# Patient Record
Sex: FEMALE
[De-identification: names, ages, dates, MRNs, and addresses within clinical notes are randomized per-mention and may not be internally consistent; named-entity substitution may affect disease eponyms.]

---
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
## 2024-04-09 ENCOUNTER — INPATIENT (INPATIENT)
Facility: HOSPITAL | Age: 19
LOS: 16 days | Discharge: HOME | End: 2024-04-26
Attending: STUDENT IN AN ORGANIZED HEALTH CARE EDUCATION/TRAINING PROGRAM | Admitting: STUDENT IN AN ORGANIZED HEALTH CARE EDUCATION/TRAINING PROGRAM
Payer: COMMERCIAL

## 2024-04-09 ENCOUNTER — EMERGENCY (EMERGENCY)
Facility: HOSPITAL | Age: 19
LOS: 1 days | Discharge: OTHER INSTITUTION | End: 2024-04-09
Attending: EMERGENCY MEDICINE
Payer: COMMERCIAL

## 2024-04-09 VITALS
OXYGEN SATURATION: 98 % | TEMPERATURE: 98 F | RESPIRATION RATE: 18 BRPM | DIASTOLIC BLOOD PRESSURE: 71 MMHG | SYSTOLIC BLOOD PRESSURE: 107 MMHG | HEART RATE: 73 BPM

## 2024-04-09 VITALS
WEIGHT: 128.31 LBS | TEMPERATURE: 98 F | RESPIRATION RATE: 18 BRPM | DIASTOLIC BLOOD PRESSURE: 80 MMHG | HEART RATE: 85 BPM | SYSTOLIC BLOOD PRESSURE: 118 MMHG | OXYGEN SATURATION: 96 %

## 2024-04-09 VITALS — WEIGHT: 130.07 LBS | HEIGHT: 70 IN | TEMPERATURE: 98 F | OXYGEN SATURATION: 99 % | RESPIRATION RATE: 16 BRPM

## 2024-04-09 DIAGNOSIS — F32.A DEPRESSION, UNSPECIFIED: ICD-10-CM

## 2024-04-09 DIAGNOSIS — F32.9 MAJOR DEPRESSIVE DISORDER, SINGLE EPISODE, UNSPECIFIED: ICD-10-CM

## 2024-04-09 LAB
ALBUMIN SERPL ELPH-MCNC: 4.2 G/DL — SIGNIFICANT CHANGE UP (ref 3.3–5.2)
ALP SERPL-CCNC: 67 U/L — SIGNIFICANT CHANGE UP (ref 40–120)
ALT FLD-CCNC: 15 U/L — SIGNIFICANT CHANGE UP
AMPHET UR-MCNC: NEGATIVE — SIGNIFICANT CHANGE UP
ANION GAP SERPL CALC-SCNC: 14 MMOL/L — SIGNIFICANT CHANGE UP (ref 5–17)
APAP SERPL-MCNC: 60.8 UG/ML — HIGH (ref 10–26)
APAP SERPL-MCNC: 93.2 UG/ML — HIGH (ref 10–26)
APPEARANCE UR: CLEAR — SIGNIFICANT CHANGE UP
AST SERPL-CCNC: 27 U/L — SIGNIFICANT CHANGE UP
BARBITURATES UR SCN-MCNC: NEGATIVE — SIGNIFICANT CHANGE UP
BASOPHILS # BLD AUTO: 0.07 K/UL — SIGNIFICANT CHANGE UP (ref 0–0.2)
BASOPHILS NFR BLD AUTO: 1 % — SIGNIFICANT CHANGE UP (ref 0–2)
BENZODIAZ UR-MCNC: NEGATIVE — SIGNIFICANT CHANGE UP
BILIRUB SERPL-MCNC: 0.6 MG/DL — SIGNIFICANT CHANGE UP (ref 0.4–2)
BILIRUB UR-MCNC: NEGATIVE — SIGNIFICANT CHANGE UP
BUN SERPL-MCNC: 14.3 MG/DL — SIGNIFICANT CHANGE UP (ref 8–20)
CALCIUM SERPL-MCNC: 8.9 MG/DL — SIGNIFICANT CHANGE UP (ref 8.4–10.5)
CHLORIDE SERPL-SCNC: 100 MMOL/L — SIGNIFICANT CHANGE UP (ref 96–108)
CO2 SERPL-SCNC: 22 MMOL/L — SIGNIFICANT CHANGE UP (ref 22–29)
COCAINE METAB.OTHER UR-MCNC: NEGATIVE — SIGNIFICANT CHANGE UP
COLOR SPEC: YELLOW — SIGNIFICANT CHANGE UP
CREAT SERPL-MCNC: 0.65 MG/DL — SIGNIFICANT CHANGE UP (ref 0.5–1.3)
DIFF PNL FLD: NEGATIVE — SIGNIFICANT CHANGE UP
EGFR: 129 ML/MIN/1.73M2 — SIGNIFICANT CHANGE UP
EOSINOPHIL # BLD AUTO: 0.09 K/UL — SIGNIFICANT CHANGE UP (ref 0–0.5)
EOSINOPHIL NFR BLD AUTO: 1.3 % — SIGNIFICANT CHANGE UP (ref 0–6)
ETHANOL SERPL-MCNC: <10 MG/DL — SIGNIFICANT CHANGE UP (ref 0–9)
GLUCOSE SERPL-MCNC: 87 MG/DL — SIGNIFICANT CHANGE UP (ref 70–99)
GLUCOSE UR QL: NEGATIVE MG/DL — SIGNIFICANT CHANGE UP
HCG SERPL-ACNC: <4 MIU/ML — SIGNIFICANT CHANGE UP
HCT VFR BLD CALC: 40.7 % — SIGNIFICANT CHANGE UP (ref 34.5–45)
HGB BLD-MCNC: 13.9 G/DL — SIGNIFICANT CHANGE UP (ref 11.5–15.5)
IMM GRANULOCYTES NFR BLD AUTO: 0.1 % — SIGNIFICANT CHANGE UP (ref 0–0.9)
KETONES UR-MCNC: ABNORMAL MG/DL
LEUKOCYTE ESTERASE UR-ACNC: NEGATIVE — SIGNIFICANT CHANGE UP
LYMPHOCYTES # BLD AUTO: 2.16 K/UL — SIGNIFICANT CHANGE UP (ref 1–3.3)
LYMPHOCYTES # BLD AUTO: 30.8 % — SIGNIFICANT CHANGE UP (ref 13–44)
MCHC RBC-ENTMCNC: 31.7 PG — SIGNIFICANT CHANGE UP (ref 27–34)
MCHC RBC-ENTMCNC: 34.2 GM/DL — SIGNIFICANT CHANGE UP (ref 32–36)
MCV RBC AUTO: 92.9 FL — SIGNIFICANT CHANGE UP (ref 80–100)
METHADONE UR-MCNC: NEGATIVE — SIGNIFICANT CHANGE UP
MONOCYTES # BLD AUTO: 0.58 K/UL — SIGNIFICANT CHANGE UP (ref 0–0.9)
MONOCYTES NFR BLD AUTO: 8.3 % — SIGNIFICANT CHANGE UP (ref 2–14)
NEUTROPHILS # BLD AUTO: 4.11 K/UL — SIGNIFICANT CHANGE UP (ref 1.8–7.4)
NEUTROPHILS NFR BLD AUTO: 58.5 % — SIGNIFICANT CHANGE UP (ref 43–77)
NITRITE UR-MCNC: NEGATIVE — SIGNIFICANT CHANGE UP
OPIATES UR-MCNC: NEGATIVE — SIGNIFICANT CHANGE UP
PCP SPEC-MCNC: SIGNIFICANT CHANGE UP
PCP UR-MCNC: NEGATIVE — SIGNIFICANT CHANGE UP
PH UR: 6 — SIGNIFICANT CHANGE UP (ref 5–8)
PLATELET # BLD AUTO: 304 K/UL — SIGNIFICANT CHANGE UP (ref 150–400)
POTASSIUM SERPL-MCNC: 4.1 MMOL/L — SIGNIFICANT CHANGE UP (ref 3.5–5.3)
POTASSIUM SERPL-SCNC: 4.1 MMOL/L — SIGNIFICANT CHANGE UP (ref 3.5–5.3)
PROT SERPL-MCNC: 7.8 G/DL — SIGNIFICANT CHANGE UP (ref 6.6–8.7)
PROT UR-MCNC: SIGNIFICANT CHANGE UP MG/DL
RBC # BLD: 4.38 M/UL — SIGNIFICANT CHANGE UP (ref 3.8–5.2)
RBC # FLD: 11.4 % — SIGNIFICANT CHANGE UP (ref 10.3–14.5)
SALICYLATES SERPL-MCNC: <0.6 MG/DL — LOW (ref 10–20)
SARS-COV-2 RNA SPEC QL NAA+PROBE: SIGNIFICANT CHANGE UP
SODIUM SERPL-SCNC: 136 MMOL/L — SIGNIFICANT CHANGE UP (ref 135–145)
SP GR SPEC: >1.03 — HIGH (ref 1–1.03)
THC UR QL: POSITIVE
UROBILINOGEN FLD QL: 1 MG/DL — SIGNIFICANT CHANGE UP (ref 0.2–1)
WBC # BLD: 7.02 K/UL — SIGNIFICANT CHANGE UP (ref 3.8–10.5)
WBC # FLD AUTO: 7.02 K/UL — SIGNIFICANT CHANGE UP (ref 3.8–10.5)

## 2024-04-09 PROCEDURE — 81003 URINALYSIS AUTO W/O SCOPE: CPT

## 2024-04-09 PROCEDURE — 84702 CHORIONIC GONADOTROPIN TEST: CPT

## 2024-04-09 PROCEDURE — 85025 COMPLETE CBC W/AUTO DIFF WBC: CPT

## 2024-04-09 PROCEDURE — 99285 EMERGENCY DEPT VISIT HI MDM: CPT | Mod: 25

## 2024-04-09 PROCEDURE — 80307 DRUG TEST PRSMV CHEM ANLYZR: CPT

## 2024-04-09 PROCEDURE — 99223 1ST HOSP IP/OBS HIGH 75: CPT

## 2024-04-09 PROCEDURE — 87635 SARS-COV-2 COVID-19 AMP PRB: CPT

## 2024-04-09 PROCEDURE — G0378: CPT

## 2024-04-09 PROCEDURE — 93005 ELECTROCARDIOGRAM TRACING: CPT

## 2024-04-09 PROCEDURE — 93010 ELECTROCARDIOGRAM REPORT: CPT

## 2024-04-09 PROCEDURE — 80053 COMPREHEN METABOLIC PANEL: CPT

## 2024-04-09 PROCEDURE — 99221 1ST HOSP IP/OBS SF/LOW 40: CPT

## 2024-04-09 PROCEDURE — 90792 PSYCH DIAG EVAL W/MED SRVCS: CPT

## 2024-04-09 PROCEDURE — 36415 COLL VENOUS BLD VENIPUNCTURE: CPT

## 2024-04-09 RX ORDER — HALOPERIDOL DECANOATE 100 MG/ML
5 INJECTION INTRAMUSCULAR EVERY 6 HOURS
Refills: 0 | Status: DISCONTINUED | OUTPATIENT
Start: 2024-04-09 | End: 2024-04-26

## 2024-04-09 RX ORDER — LANOLIN ALCOHOL/MO/W.PET/CERES
3 CREAM (GRAM) TOPICAL AT BEDTIME
Refills: 0 | Status: DISCONTINUED | OUTPATIENT
Start: 2024-04-09 | End: 2024-04-22

## 2024-04-09 RX ORDER — HALOPERIDOL DECANOATE 100 MG/ML
5 INJECTION INTRAMUSCULAR EVERY 6 HOURS
Refills: 0 | Status: DISCONTINUED | OUTPATIENT
Start: 2024-04-09 | End: 2024-04-10

## 2024-04-09 NOTE — ED PROVIDER NOTE - OBJECTIVE STATEMENT
18-year-old female history of depression presents status post overdose.  At approximately 0030 this evening took 12 500 mg tablets of Tylenol in an attempt to commit suicide.  States she felt  increasingly depressed and helpless, has had increased stress at home.  1 prior suicide attempt by taking Benadryl but did not seek medical attention at that point.  No history of psychiatric admissions.  No longer follows with a therapist or psychiatrist.  Denies alcohol use, drug use, coingestants.  Denies nausea, vomiting, abdominal pain, or any additional symptoms.

## 2024-04-09 NOTE — BH INPATIENT PSYCHIATRY ASSESSMENT NOTE - NSBHMETABOLIC_PSY_ALL_CORE_FT
BMI:   HbA1c:   Glucose:   BP: --Vital Signs Last 24 Hrs  T(C): 36.8 (04-09-24 @ 15:20), Max: 36.8 (04-09-24 @ 15:20)  T(F): 98.3 (04-09-24 @ 15:20), Max: 98.3 (04-09-24 @ 15:20)  HR: 73 (04-09-24 @ 15:20) (65 - 85)  BP: 107/71 (04-09-24 @ 15:20) (106/69 - 128/76)  BP(mean): --  RR: 18 (04-09-24 @ 15:20) (17 - 18)  SpO2: 98% (04-09-24 @ 15:20) (96% - 99%)      Lipid Panel:  BMI: BMI (kg/m2): 18.7 (04-09-24 @ 17:56)  HbA1c:   Glucose:   BP: --Vital Signs Last 24 Hrs  T(C): 36.7 (04-09-24 @ 17:56), Max: 36.8 (04-09-24 @ 15:20)  T(F): 98.1 (04-09-24 @ 17:56), Max: 98.3 (04-09-24 @ 15:20)  HR: 73 (04-09-24 @ 15:20) (65 - 85)  BP: 107/71 (04-09-24 @ 15:20) (106/69 - 128/76)  BP(mean): --  RR: 16 (04-09-24 @ 17:56) (16 - 18)  SpO2: 99% (04-09-24 @ 17:56) (96% - 99%)    Orthostatic VS  04-09-24 @ 17:56  Lying BP: --/-- HR: --  Sitting BP: 106/74 HR: 89  Standing BP: 117/74 HR: 85  Site: --  Mode: --    Lipid Panel:

## 2024-04-09 NOTE — BH INPATIENT PSYCHIATRY ASSESSMENT NOTE - NSBHCHARTREVIEWVS_PSY_A_CORE FT
Vital Signs Last 24 Hrs  T(C): 36.8 (04-09-24 @ 15:20), Max: 36.8 (04-09-24 @ 15:20)  T(F): 98.3 (04-09-24 @ 15:20), Max: 98.3 (04-09-24 @ 15:20)  HR: 73 (04-09-24 @ 15:20) (65 - 85)  BP: 107/71 (04-09-24 @ 15:20) (106/69 - 128/76)  BP(mean): --  RR: 18 (04-09-24 @ 15:20) (17 - 18)  SpO2: 98% (04-09-24 @ 15:20) (96% - 99%)     Vital Signs Last 24 Hrs  T(C): 36.7 (04-09-24 @ 17:56), Max: 36.8 (04-09-24 @ 15:20)  T(F): 98.1 (04-09-24 @ 17:56), Max: 98.3 (04-09-24 @ 15:20)  HR: 73 (04-09-24 @ 15:20) (65 - 85)  BP: 107/71 (04-09-24 @ 15:20) (106/69 - 128/76)  BP(mean): --  RR: 16 (04-09-24 @ 17:56) (16 - 18)  SpO2: 99% (04-09-24 @ 17:56) (96% - 99%)    Orthostatic VS  04-09-24 @ 17:56  Lying BP: --/-- HR: --  Sitting BP: 106/74 HR: 89  Standing BP: 117/74 HR: 85  Site: --  Mode: --

## 2024-04-09 NOTE — ED BEHAVIORAL HEALTH ASSESSMENT NOTE - HPI (INCLUDE ILLNESS QUALITY, SEVERITY, DURATION, TIMING, CONTEXT, MODIFYING FACTORS, ASSOCIATED SIGNS AND SYMPTOMS)
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 Pt states that she has been feeling increasingly overwhelmed and depressed, decided to take pills in her room while her mother was sleeping. Pt states that her Uncle passed unexpectedly in August and her grandmother  3 months later, has been having a very hard time since then. States she hears herself saying "just do it and get it over with" in her head. Furthermore, tore her labrum recently and has not been able to go to the gym which was a coping mechanism for her. States that she has suicidal thoughts every day, despite attending grief support groups once per week for about 4 weeks. Pt endorses not eating or sleeping well for the past few days, states that on the day of the attempt she did not get out of bed or take care of herself at all. Pt has one prior suicide attempt about 1.5 years ago, attempted with Benadryl overdose and called suicide hotline. Did not receive medical attention following this attempt. Furthermore, pt states she saw a psychiatrist for about one year at the age of 14 but has not sought any medical attention for her mental health since then, does not take any medication. 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    Pt states that she has been feeling increasingly overwhelmed and depressed, decided to take pills in her room while her mother was sleeping. Pt states that her Uncle passed unexpectedly in August and her grandmother  3 months later, has been having a very hard time since then. States she hears herself saying "just do it and get it over with" in her head. Furthermore, tore her labrum recently and has not been able to go to the gym which was a coping mechanism for her. States that she has suicidal thoughts every day, despite attending grief support groups once per week for about 4 weeks. Pt endorses not eating or sleeping well for the past few days. increasingly amotivated anhedonic. states that on the day of the attempt she did not get out of bed or take care of herself at all. Pt has one prior suicide attempt about 1.5 years ago, attempted with Benadryl overdose and called suicide hotline. Did not receive medical attention following this attempt. Furthermore, pt states she saw a psychiatrist for about one year at the age of 14 but has not sought any medical attention for her mental health since then, does not take any medication.  does not appear overtly manic or psychotic. no avh, no overt delusions elicited.  drinks alcohol about weekly ~5 shots of hard liquor. (no history of complicated withdrawal or symptoms of severe withdrawal eg seizures delirium tremens). smokes cannabis occasional one blunt for sleep and appetite aid. no tobacco.

## 2024-04-09 NOTE — BH INPATIENT PSYCHIATRY ASSESSMENT NOTE - DETAILS
Feels suicidal every day, during current attempt, states she impulsively decided to overdose on Tylenol

## 2024-04-09 NOTE — CHART NOTE - NSCHARTNOTEFT_GEN_A_CORE
CHRISTOS Note: Notified by  provider that the plan is to transfer pt for IP psychiatric care. Met with pt who was agreeable to the plan and will complete 9.13 legals. Discussed a referral to [facility] college unit. pt in agreement. Spoke with Desi @ [facility] and referral pending MD review. SW will follow

## 2024-04-09 NOTE — BH INPATIENT PSYCHIATRY ASSESSMENT NOTE - OTHER PAST PSYCHIATRIC HISTORY (INCLUDE DETAILS REGARDING ONSET, COURSE OF ILLNESS, INPATIENT/OUTPATIENT TREATMENT)
Hx depression, anxiety. Pt has one prior suicide attempt about 1.5 years ago, attempted with Benadryl overdose and called suicide hotline. Did not receive medical attention following this attempt. Furthermore, pt states she saw a therapist for about one year at the age of 14 but has not sought any attention for her mental health since then, does not take any medication.

## 2024-04-09 NOTE — ED PROVIDER NOTE - ATTENDING CONTRIBUTION TO CARE
Delia: I performed a face to face bedside interview with patient regarding history of present illness, review of symptoms and past medical history. I completed an independent physical exam and ordered tests/medications as needed.  I have discussed patient's plan of care with the resident. The resident assisted in  executing the discussed plan. I was available for any questions or issues that may have arose during the execution of the plan of care.

## 2024-04-09 NOTE — ED ADULT NURSE REASSESSMENT NOTE - DESCRIPTION
received pt in [facility] by security for contraband. [facility].  as per pt mentally she had a hard time.  she went into the cabinet found tylenol and googled how much she would need to take to overdose.  as per pt she read 4000mg so she took 6000.  she then spoke with a friend who told her it would not kill her right away. pt states she thought it would.  when she found out she called her father.  pt admits to 1 past s/a stating she took benadryl ans was drinking.  pt denies being hospitalized after first attempt.  pt denies pmh, pph.  denies avh denies h/i. she admits to occasionally drinking. but when she does she "usually gets drunk" as per pt drinks 5-6 shots when she drinks. denies drug use.

## 2024-04-09 NOTE — ED BEHAVIORAL HEALTH ASSESSMENT NOTE - DETAILS
Feels suicidal every day, during current attempt, states she impulsively decided to overdose on Tylenol self referred tbd

## 2024-04-09 NOTE — ED ADULT TRIAGE NOTE - CHIEF COMPLAINT QUOTE
s/p took tylenol 6000mg about 35 mins ago, Suicidal. Pt has been depressed lost 2 family members in the past 3 months. Hx of anxiety

## 2024-04-09 NOTE — ED BEHAVIORAL HEALTH ASSESSMENT NOTE - NSBHSAALC_PSY_A_CORE FT
Socially Socially, drinks on the weekends, has not drank in about 2 weeks. States she usually takes about 6 shots.

## 2024-04-09 NOTE — ED BEHAVIORAL HEALTH ASSESSMENT NOTE - RISK ASSESSMENT
rf:  mental health condition(s)  substance abuse  suicide attempts    pf:  no access to firearms  help seeking

## 2024-04-09 NOTE — ED BEHAVIORAL HEALTH ASSESSMENT NOTE - TELEPSYCHIATRY?
Problem: Postoperative Care  Goal: Elimination status is maintained/returned to baseline  Outcome: Outcome Not Met, Continue to Monitor  Patient on a bowel prep       No

## 2024-04-09 NOTE — ED PROVIDER NOTE - CLINICAL SUMMARY MEDICAL DECISION MAKING FREE TEXT BOX
Patient s/p Tylenol overdose, reported 6 g in total.  Plan for labs, one-to-one, 4-hour Tylenol level and if medically cleared will have patient seen by behavioral health. Patient s/p Tylenol overdose, reported 6 g in total.  Plan for labs, one-to-one, 4-hour Tylenol level and if medically cleared will have patient seen by behavioral health.    4-hour level 60.8.  No LFT abnormalities.  Patient remains asymptomatic.  Patient is medically cleared to go back to behavioral health.

## 2024-04-09 NOTE — ED BEHAVIORAL HEALTH ASSESSMENT NOTE - DESCRIPTION
Lives with mother, step-father, and sister. 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 Vital Signs Last 24 Hrs  T(C): 36.7 (09 Apr 2024 07:29), Max: 36.7 (09 Apr 2024 01:25)  T(F): 98.1 (09 Apr 2024 07:29), Max: 98.1 (09 Apr 2024 07:29)  HR: 75 (09 Apr 2024 07:29) (65 - 85)  BP: 119/60 (09 Apr 2024 07:29) (106/69 - 128/76)  BP(mean): --  RR: 18 (09 Apr 2024 07:29) (17 - 18)  SpO2: 98% (09 Apr 2024 07:29) (96% - 99%)    Parameters below as of 09 Apr 2024 07:29  Patient On (Oxygen Delivery Method): room air    ECG and UA completed None

## 2024-04-09 NOTE — BH INPATIENT PSYCHIATRY ASSESSMENT NOTE - NSBHASSESSSUMMFT_PSY_ALL_CORE
18 year-old with no previous psych admissions not currently on meds, s/p Tylenol ingestion, medically cleared after 4 hour levels not in danger, no NAC protocol, no LFT elevations.   Routine checks, no current suicidal ideations  LFTs for tomorrow AM  Defer meds to primary team

## 2024-04-09 NOTE — ED ADULT NURSE NOTE - OBJECTIVE STATEMENT
assumed care of pt in CC ar 1330. pt changed into yellow gown, belongings secured. pt reports taking 6000 mg of Tylenol at 12:30. pt reports SI, after not cooping well with death in her family recently. pt denies prior hospitalizations. pmh of anxiety and depression. c/o abd pain. rr even and unlabored. placed on cardiac monitor and . anox4. denies HI, auditory, visual hallucinations. denies drugs or alcohol. pt educated on plan of care, pt able to successfully teach back plan of care to RN, RN will continue to reeducate pt during hospital stay.

## 2024-04-09 NOTE — ED BEHAVIORAL HEALTH ASSESSMENT NOTE - NSSUICPROTFACT_PSY_ALL_CORE
Upcoming travel/Supportive social network of family or friends/Engaged in work or school DC instructions

## 2024-04-09 NOTE — BH INPATIENT PSYCHIATRY ASSESSMENT NOTE - HPI (INCLUDE ILLNESS QUALITY, SEVERITY, DURATION, TIMING, CONTEXT, MODIFYING FACTORS, ASSOCIATED SIGNS AND SYMPTOMS)
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 and works as a  part-time, PMHx depression and anxiety presented to [facility] ED s/p 6000mg Tylenol in attempted overdose.    Pt states that she has been feeling increasingly overwhelmed and depressed, decided to take pills in her room while her mother was sleeping. Pt states that her Uncle passed unexpectedly in August and her grandmother  3 months later, has been having a very hard time since then. States she hears herself saying "just do it and get it over with" in her head. Furthermore, tore her labrum recently and has not been able to go to the gym which was a coping mechanism for her. States that she has suicidal thoughts every day, despite attending grief support groups once per week for about 4 weeks. Pt endorses not eating or sleeping well for the past few days. increasingly amotivated anhedonic. states that on the day of the attempt she did not get out of bed or take care of herself at all. Pt has one prior suicide attempt about 1.5 years ago, attempted with Benadryl overdose and called suicide hotline. Did not receive medical attention following this attempt. Furthermore, pt states she saw a psychiatrist for about one year at the age of 14 but has not sought any medical attention for her mental health since then, does not take any medication.  does not appear overtly manic or psychotic. no avh, no overt delusions elicited.  drinks alcohol about weekly ~5 shots of hard liquor. (no history of complicated withdrawal or symptoms of severe withdrawal eg seizures delirium tremens). smokes cannabis occasional one blunt for sleep and appetite aid. no tobacco. 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 ED s/p 6000mg Tylenol in attempted overdose. Pt states that she has been feeling increasingly overwhelmed and depressed, decided to take pills in her room while her mother was sleeping. Pt states that her Uncle passed unexpectedly in August and her grandmother  3 months later, has been having a very hard time since then. States she hears herself saying "just do it and get it over with" in her head. Furthermore, tore her labrum recently and has not been able to go to the gym which was a coping mechanism for her. States that she has suicidal thoughts every day, despite attending grief support groups once per week for about 4 weeks. Pt endorses not eating or sleeping well for the past few days. increasingly amotivated anhedonic. states that on the day of the attempt she did not get out of bed or take care of herself at all. Pt has one prior suicide attempt about 1.5 years ago, attempted with Benadryl overdose and called suicide hotline. Did not receive medical attention following this attempt. Furthermore, pt states she saw a psychiatrist for about one year at the age of 14 but has not sought any medical attention for her mental health since then, does not take any medication. .drinks alcohol about weekly ~5 shots of hard liquor. (no history of complicated withdrawal or symptoms of severe withdrawal eg seizures delirium tremens). smokes cannabis occasional one blunt for sleep and appetite aid. no tobacco. Acetaminophen 4 hour level from 93.2 to 60.8 ug/ml, medically cleared for psychiatric admission.

## 2024-04-09 NOTE — BH PATIENT PROFILE - IS PATIENT PREGNANT?
Fax received from [facility] stating:    \"There is a delay of testing on the following test\", Paraneoplastic Autoantibody Evaluation.         Dr. Rosen- Just a FYI for you.   no

## 2024-04-09 NOTE — ED BEHAVIORAL HEALTH ASSESSMENT NOTE - OTHER PAST PSYCHIATRIC HISTORY (INCLUDE DETAILS REGARDING ONSET, COURSE OF ILLNESS, INPATIENT/OUTPATIENT TREATMENT)
Hx depression, anxiety. Pt has one prior suicide attempt about 1.5 years ago, attempted with Benadryl overdose and called suicide hotline. Did not receive medical attention following this attempt. Furthermore, pt states she saw a psychiatrist for about one year at the age of 14 but has not sought any medical attention for her mental health since then, does not take any medication. Hx depression, anxiety. Pt has one prior suicide attempt about 1.5 years ago, attempted with Benadryl overdose and called suicide hotline. Did not receive medical attention following this attempt. Furthermore, pt states she saw a therapist for about one year at the age of 14 but has not sought any attention for her mental health since then, does not take any medication.

## 2024-04-09 NOTE — ED BEHAVIORAL HEALTH ASSESSMENT NOTE - SUBSTANCE ISSUES AND PLAN (INCLUDE STANDING AND PRN MEDICATION)
(no history of complicated withdrawal or symptoms of severe withdrawal eg seizures delirium tremens); no recent use

## 2024-04-09 NOTE — BH INPATIENT PSYCHIATRY ASSESSMENT NOTE - NSBHSAALC_PSY_A_CORE FT
Socially, drinks on the weekends, has not drank in about 2 weeks. States she usually takes about 6 shots.

## 2024-04-09 NOTE — BH INPATIENT PSYCHIATRY ASSESSMENT NOTE - MSE UNSTRUCTURED FT
Patient is awake and alert. Affect neutral. Speech fluent. Logical thought. No retardation or agitation, calm at time of exam, well related, no distress. No delusions. Reports "I didn't realize Tylenol could do that slowly." No current suicidal ideations. Fair insight.

## 2024-04-09 NOTE — ED ADULT NURSE REASSESSMENT NOTE - NS ED NURSE REASSESS COMMENT FT1
Assumed care of patient at 07:15.  Patient resting ion bed awake.  Patient oriented to staff and educated about plan of care.  Patient mood is depressed.  Patient reports she does not regret suicide attempt but is hopeful for treatment for her depression.  Verbally contracts not to harm herself in the hospital.  No attempts to harm self or others and safety maintained.
Nurse to nurse report called to Valerie MARQUIS on [facility] at [facility].
pt a&ox3, in no acute distress, resps even and unlabored. pt medically cleared to go to . pt and family updated on POC and verbalizes understanding. report given to Vanessa MARQUIS. pt brought over to  with no incident. pt is calm and cooperative. safety maintained.
pt was evaluated by psychiatry and disposition for inpatient treatment. No recent attempts to harm self or others. pt safety being maintained.
report given to BENITEZ Garay at 0215. pt moved to b4l. rr even and unlabored. continued cardiac monitoring in place. 1:1 at bedside to maintain safety. rn made aware of transfer of care. pt educated on plan of care, pt able to successfully teach back plan of care to RN, RN will continue to reeducate pt during hospital stay.
assumed care of pt from BENITEZ Head. pt a&ox3, in NAD, resps even and unlabored. pt resting comfortably in bed with parents at bedside. pt remains in yellow gown with belongings secured and 1:1 at bedside. pt and family informed of poc and verbalizes understanding. safety maintained.

## 2024-04-09 NOTE — ED CDU PROVIDER INITIAL DAY NOTE - PROGRESS NOTE DETAILS
I have discussed and reviewed the case with [facility] toxicology Fellow Anurag, they agree that pt is cleared from a toxicologic perspective

## 2024-04-09 NOTE — ED PROVIDER NOTE - PHYSICAL EXAMINATION
Gen: Well appearing in NAD  Head: NC/AT  Neck: trachea midline  Resp:  No distress, CTAB  CV: RRR  GI: soft, NTND  Ext: no deformities  Neuro:  A&O appears non focal  Skin:  Warm and dry as visualized  Psych:   flat affect

## 2024-04-09 NOTE — ED BEHAVIORAL HEALTH ASSESSMENT NOTE - HOMICIDALITY / AGGRESSION (CURRENT/PAST)
Schedule future visit for biopsy of the mole on your chest.    Schedule a future fasting lab only visit at which time we will check your kidney function, liver function, fasting blood sugar, and cholesterol.  We will also screen for hepatitis C and HIV per current preventive care guidelines.  
None known in lifetime

## 2024-04-09 NOTE — BH INPATIENT PSYCHIATRY ASSESSMENT NOTE - DESCRIPTION
Lives with mother, step-father, and sister. 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

## 2024-04-09 NOTE — ED BEHAVIORAL HEALTH ASSESSMENT NOTE - SUMMARY
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 Pt states that she has been feeling increasingly overwhelmed and depressed, decided to take pills in her room while her mother was sleeping. Pt states that her Uncle passed unexpectedly in August and her grandmother  3 months later, has been having a very hard time since then. States she hears herself saying "just do it and get it over with" in her head. Furthermore, tore her labrum recently and has not been able to go to the gym which was a coping mechanism for her. States that she has suicidal thoughts every day, despite attending grief support groups once per week for about 4 weeks. Pt endorses not eating or sleeping well for the past few days, states that on the day of the attempt she did not get out of bed or take care of herself at all. Pt has one prior suicide attempt about 1.5 years ago, attempted with Benadryl overdose and called suicide hotline. Did not receive medical attention following this attempt. Furthermore, pt states she saw a psychiatrist for about one year at the age of 14 but has not sought any medical attention for her mental health since then, does not take any medication. 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  longstanding depression presenting with a depressive episode and s/p suicide attempt. today remains equivocal regarding her recent actions to end her life. amenable to an admission.  Would benefit from inpatient psychiatric admission for assurance of safety, further stabilization, medication management, and diagnostic clarification.

## 2024-04-09 NOTE — BH PATIENT PROFILE - FALL HARM RISK - UNIVERSAL INTERVENTIONS
Bed in lowest position, wheels locked, appropriate side rails in place/Call bell, personal items and telephone in reach/Instruct patient to call for assistance before getting out of bed or chair/Non-slip footwear when patient is out of bed/[place] to call system/Physically safe environment - no spills, clutter or unnecessary equipment/Purposeful Proactive Rounding/Room/bathroom lighting operational, light cord in reach

## 2024-04-09 NOTE — ED ADULT NURSE REASSESSMENT NOTE - COMFORT CARE
plan of care explained
side rails up/warm blanket provided
darkened lights/plan of care explained/po fluids offered

## 2024-04-10 LAB
ALBUMIN SERPL ELPH-MCNC: 4.5 G/DL — SIGNIFICANT CHANGE UP (ref 3.3–5)
ALP SERPL-CCNC: 68 U/L — SIGNIFICANT CHANGE UP (ref 40–120)
ALT FLD-CCNC: 14 U/L — SIGNIFICANT CHANGE UP (ref 4–33)
ANION GAP SERPL CALC-SCNC: 13 MMOL/L — SIGNIFICANT CHANGE UP (ref 7–14)
AST SERPL-CCNC: 20 U/L — SIGNIFICANT CHANGE UP (ref 4–32)
BILIRUB SERPL-MCNC: 0.7 MG/DL — SIGNIFICANT CHANGE UP (ref 0.2–1.2)
BUN SERPL-MCNC: 15 MG/DL — SIGNIFICANT CHANGE UP (ref 7–23)
CALCIUM SERPL-MCNC: 9.7 MG/DL — SIGNIFICANT CHANGE UP (ref 8.4–10.5)
CHLORIDE SERPL-SCNC: 101 MMOL/L — SIGNIFICANT CHANGE UP (ref 98–107)
CO2 SERPL-SCNC: 24 MMOL/L — SIGNIFICANT CHANGE UP (ref 22–31)
CREAT SERPL-MCNC: 0.89 MG/DL — SIGNIFICANT CHANGE UP (ref 0.5–1.3)
EGFR: 95 ML/MIN/1.73M2 — SIGNIFICANT CHANGE UP
GLUCOSE SERPL-MCNC: 84 MG/DL — SIGNIFICANT CHANGE UP (ref 70–99)
POTASSIUM SERPL-MCNC: 3.8 MMOL/L — SIGNIFICANT CHANGE UP (ref 3.5–5.3)
POTASSIUM SERPL-SCNC: 3.8 MMOL/L — SIGNIFICANT CHANGE UP (ref 3.5–5.3)
PROT SERPL-MCNC: 8.1 G/DL — SIGNIFICANT CHANGE UP (ref 6–8.3)
SODIUM SERPL-SCNC: 138 MMOL/L — SIGNIFICANT CHANGE UP (ref 135–145)

## 2024-04-10 PROCEDURE — 90853 GROUP PSYCHOTHERAPY: CPT

## 2024-04-10 PROCEDURE — 99232 SBSQ HOSP IP/OBS MODERATE 35: CPT

## 2024-04-10 RX ORDER — HALOPERIDOL DECANOATE 100 MG/ML
5 INJECTION INTRAMUSCULAR ONCE
Refills: 0 | Status: DISCONTINUED | OUTPATIENT
Start: 2024-04-10 | End: 2024-04-26

## 2024-04-10 RX ORDER — FLUOXETINE HCL 10 MG
10 CAPSULE ORAL DAILY
Refills: 0 | Status: DISCONTINUED | OUTPATIENT
Start: 2024-04-11 | End: 2024-04-12

## 2024-04-10 RX ADMIN — Medication 2 MILLIGRAM(S): at 21:21

## 2024-04-10 RX ADMIN — Medication 3 MILLIGRAM(S): at 20:43

## 2024-04-10 NOTE — BH SOCIAL WORK INITIAL PSYCHOSOCIAL EVALUATION - OTHER PAST PSYCHIATRIC HISTORY (INCLUDE DETAILS REGARDING ONSET, COURSE OF ILLNESS, INPATIENT/OUTPATIENT TREATMENT)
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 There is no collateral contact information provided. She has been experiencing symptoms of depression including poor sleep, appetite, and motivation with anhedonia. Her uncle  unexpectedly in August and her Grandmother  three months later. She has been going to a grief group for the past four months, but otherwise has no treatment. She has been having a hard time and saying to herself "just do it and get it over with." She saw a psychiatrist for a year at age 14, but not recently. She has one previous suicide attempt prior to this overdose about 18 months ago via overdose on Benadryl after which she called the Suicide Hotline. She drinks alcohol and smokes Cannabis to help her sleep and to increase her appetite. She has no reported history of trauma, aggression, or legal issues. The patient has [employer] Out of [employer].

## 2024-04-11 PROCEDURE — 99232 SBSQ HOSP IP/OBS MODERATE 35: CPT

## 2024-04-11 PROCEDURE — 90853 GROUP PSYCHOTHERAPY: CPT

## 2024-04-11 RX ORDER — HYDROXYZINE HCL 10 MG
25 TABLET ORAL THREE TIMES A DAY
Refills: 0 | Status: DISCONTINUED | OUTPATIENT
Start: 2024-04-11 | End: 2024-04-15

## 2024-04-11 RX ADMIN — Medication 25 MILLIGRAM(S): at 22:37

## 2024-04-11 RX ADMIN — Medication 2 MILLIGRAM(S): at 15:59

## 2024-04-11 RX ADMIN — Medication 3 MILLIGRAM(S): at 22:32

## 2024-04-11 RX ADMIN — Medication 10 MILLIGRAM(S): at 08:24

## 2024-04-12 PROCEDURE — 99233 SBSQ HOSP IP/OBS HIGH 50: CPT

## 2024-04-12 PROCEDURE — 90853 GROUP PSYCHOTHERAPY: CPT

## 2024-04-12 RX ORDER — FLUOXETINE HCL 10 MG
20 CAPSULE ORAL DAILY
Refills: 0 | Status: DISCONTINUED | OUTPATIENT
Start: 2024-04-13 | End: 2024-04-24

## 2024-04-12 RX ADMIN — Medication 25 MILLIGRAM(S): at 21:34

## 2024-04-12 RX ADMIN — Medication 10 MILLIGRAM(S): at 08:43

## 2024-04-12 RX ADMIN — Medication 3 MILLIGRAM(S): at 21:34

## 2024-04-12 RX ADMIN — Medication 25 MILLIGRAM(S): at 17:35

## 2024-04-12 NOTE — DIETITIAN INITIAL EVALUATION ADULT - PERTINENT MEDS FT
MEDICATIONS  (STANDING):    MEDICATIONS  (PRN):  haloperidol     Tablet 5 milliGRAM(s) Oral every 6 hours PRN agitation  haloperidol    Injectable 5 milliGRAM(s) IntraMuscular once PRN severe agitation  hydrOXYzine hydrochloride 25 milliGRAM(s) Oral three times a day PRN Anxiety  LORazepam     Tablet 2 milliGRAM(s) Oral every 6 hours PRN agitation/anxiety  LORazepam   Injectable 2 milliGRAM(s) IntraMuscular once PRN severe agitation  melatonin. 3 milliGRAM(s) Oral at bedtime PRN Insomnia

## 2024-04-12 NOTE — DIETITIAN INITIAL EVALUATION ADULT - PERSON TAUGHT/METHOD
Provided verbal education re: Healthy eating, small frequent meals and calorie densed foods./verbal instruction/patient instructed
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
## 2024-04-13 PROCEDURE — 99231 SBSQ HOSP IP/OBS SF/LOW 25: CPT

## 2024-04-13 RX ADMIN — Medication 25 MILLIGRAM(S): at 11:10

## 2024-04-13 RX ADMIN — Medication 25 MILLIGRAM(S): at 21:06

## 2024-04-13 RX ADMIN — Medication 20 MILLIGRAM(S): at 09:53

## 2024-04-13 RX ADMIN — Medication 3 MILLIGRAM(S): at 21:07

## 2024-04-14 PROCEDURE — 99232 SBSQ HOSP IP/OBS MODERATE 35: CPT

## 2024-04-14 RX ADMIN — Medication 3 MILLIGRAM(S): at 22:48

## 2024-04-14 RX ADMIN — Medication 20 MILLIGRAM(S): at 08:57

## 2024-04-14 RX ADMIN — Medication 2 MILLIGRAM(S): at 21:05

## 2024-04-14 RX ADMIN — Medication 25 MILLIGRAM(S): at 13:57

## 2024-04-14 NOTE — BH INPATIENT PSYCHIATRY PROGRESS NOTE - MSE UNSTRUCTURED FT
On exam today the patient is generally cooperative with fair eye contact.    Speech is clear and of normal rate.    Thought process: with no evidence of a disorder of thought process.    Thought content: with no evidence of psychotic beliefs.  Perception: Denies hallucinations.    Mood: Describes as "less depressed".  Affect: constricted.    Patient with passive SI and reunification fantasies but denies suicidal ideation, active intent and plan.    Patient denies active aggressive/homicidal ideation, intent or plan.   Patient is Alert and oriented in all spheres. Patient is cognitively grossly intact.   Insight and judgment are limited. Impulse control is intact at this time.    Vital signs are stable. Gait and station WNL  
On exam today the patient is generally cooperative with fair eye contact.    Speech is clear and of normal rate.    Thought process: with no evidence of a disorder of thought process.    Thought content: with no evidence of psychotic beliefs.  Perception: Denies hallucinations.    Mood: Describes as "less depressed".  Affect: constricted.    Patient denies suicidal ideation, active intent and plan.    Patient denies active aggressive/homicidal ideation, intent or plan.   Patient is Alert and oriented in all spheres. Patient is cognitively grossly intact.   Insight and judgment are limited. Impulse control is intact at this time.    Vital signs are stable. Gait and station WNL

## 2024-04-14 NOTE — BH INPATIENT PSYCHIATRY PROGRESS NOTE - NSBHTIMEACTIVITIESPERFORMED_PSY_A_CORE
Time spent with patient included patient interview, reviewing chart and charting and reviewing case with nursing staff
Time spent with patient included patient interview, reviewing chart and charting and reviewing case with nursing staff

## 2024-04-15 PROCEDURE — 99232 SBSQ HOSP IP/OBS MODERATE 35: CPT

## 2024-04-15 RX ORDER — HYDROXYZINE HCL 10 MG
50 TABLET ORAL THREE TIMES A DAY
Refills: 0 | Status: DISCONTINUED | OUTPATIENT
Start: 2024-04-15 | End: 2024-04-26

## 2024-04-15 RX ADMIN — Medication 20 MILLIGRAM(S): at 08:45

## 2024-04-15 RX ADMIN — Medication 50 MILLIGRAM(S): at 17:06

## 2024-04-15 RX ADMIN — Medication 3 MILLIGRAM(S): at 21:36

## 2024-04-15 RX ADMIN — Medication 50 MILLIGRAM(S): at 21:35

## 2024-04-16 PROCEDURE — 90853 GROUP PSYCHOTHERAPY: CPT

## 2024-04-16 PROCEDURE — 99232 SBSQ HOSP IP/OBS MODERATE 35: CPT

## 2024-04-16 RX ORDER — ARIPIPRAZOLE 15 MG/1
5 TABLET ORAL DAILY
Refills: 0 | Status: DISCONTINUED | OUTPATIENT
Start: 2024-04-16 | End: 2024-04-26

## 2024-04-16 RX ADMIN — ARIPIPRAZOLE 5 MILLIGRAM(S): 15 TABLET ORAL at 12:33

## 2024-04-16 RX ADMIN — Medication 3 MILLIGRAM(S): at 21:30

## 2024-04-16 RX ADMIN — Medication 20 MILLIGRAM(S): at 08:51

## 2024-04-17 PROCEDURE — 90853 GROUP PSYCHOTHERAPY: CPT

## 2024-04-17 PROCEDURE — 99232 SBSQ HOSP IP/OBS MODERATE 35: CPT

## 2024-04-17 RX ORDER — LANOLIN ALCOHOL/MO/W.PET/CERES
3 CREAM (GRAM) TOPICAL ONCE
Refills: 0 | Status: COMPLETED | OUTPATIENT
Start: 2024-04-17 | End: 2024-04-17

## 2024-04-17 RX ADMIN — ARIPIPRAZOLE 5 MILLIGRAM(S): 15 TABLET ORAL at 09:58

## 2024-04-17 RX ADMIN — Medication 20 MILLIGRAM(S): at 09:58

## 2024-04-17 RX ADMIN — Medication 50 MILLIGRAM(S): at 21:27

## 2024-04-17 RX ADMIN — Medication 3 MILLIGRAM(S): at 00:43

## 2024-04-17 RX ADMIN — Medication 3 MILLIGRAM(S): at 21:27

## 2024-04-18 PROCEDURE — 90853 GROUP PSYCHOTHERAPY: CPT

## 2024-04-18 PROCEDURE — 99232 SBSQ HOSP IP/OBS MODERATE 35: CPT

## 2024-04-18 RX ADMIN — Medication 20 MILLIGRAM(S): at 09:39

## 2024-04-18 RX ADMIN — ARIPIPRAZOLE 5 MILLIGRAM(S): 15 TABLET ORAL at 09:39

## 2024-04-18 RX ADMIN — Medication 50 MILLIGRAM(S): at 21:52

## 2024-04-18 RX ADMIN — Medication 3 MILLIGRAM(S): at 21:53

## 2024-04-18 NOTE — BH PSYCHOLOGY - GROUP THERAPY NOTE - NSPSYCHOLGRPCOGINT_PSY_A_CORE FT
[employer] is an ACT-based group in which patients learn skills and explore themes of identity, compassion, resilience, and connection through the lens of marginalized identity. Group begins with setting group expectations and introductions that focus on identity exploration. Following introductions, the daily theme is presented through both didactics and experiential activities. Group today focused on the theme “connection.” Patients were provided psychoeducation about connection and engaged in discussion about the importance of developing healthy relationships.  led patients in an experiential exercise titled “Taking a Relationship Inventory” and helped patients to connect their values to their relationships. 
[employer] is an ACT-based group in which patients learn skills and explore themes of identity, compassion, resilience, and connection through the lens of marginalized identity. Group begins with setting group expectations and introductions that focus on identity exploration. Following introductions, the daily theme is presented through both didactics and experiential activities. Group today focused on the theme “resilience.” Patients were provided psychoeducation about resilience and engaged in discussion about the importance of developing resilience to cope with stressful times.  led patients in an experiential exercise that focused on positive inner self-talk and growing resilience.

## 2024-04-19 PROCEDURE — 99232 SBSQ HOSP IP/OBS MODERATE 35: CPT

## 2024-04-19 RX ADMIN — Medication 20 MILLIGRAM(S): at 08:34

## 2024-04-19 RX ADMIN — Medication 50 MILLIGRAM(S): at 21:15

## 2024-04-19 RX ADMIN — Medication 3 MILLIGRAM(S): at 21:15

## 2024-04-19 RX ADMIN — ARIPIPRAZOLE 5 MILLIGRAM(S): 15 TABLET ORAL at 08:35

## 2024-04-19 NOTE — BH DISCHARGE NOTE NURSING/SOCIAL WORK/PSYCH REHAB - NSCDUDCCRISIS_PSY_A_CORE
.  [facility]  [phone] [place] [id number] [phone]   Hours:  Monday through Friday from 9 AM to 3 PM/.  [facility]  [address] [place] [address] [id number] [phone]   Hours: Monday through Friday from 10 AM to 4 PM

## 2024-04-19 NOTE — BH DISCHARGE NOTE NURSING/SOCIAL WORK/PSYCH REHAB - NSFLUVACAGEDISCH_IMM_ALL_CORE
Robert Barron (Rojas: [id number]) - [id number]  NovoLOG 100UNIT/ML solution  Status: [place] Response - Approved  Created: March 7th, 2022 [id number]  Sent: March 7th, 2022    Called deedee bond    Adult

## 2024-04-19 NOTE — BH DISCHARGE NOTE NURSING/SOCIAL WORK/PSYCH REHAB - [facility]
Subjective   HPI Comments: The patient comes to the emergency room by [facility] as a transfer from [facility].  She says she awakened this morning with some pain in her left shoulder area.  She thought was just muscular pain so she tried to work through the pain and do some things around the house but the pain seemed a little worse.  She admits had some pain in her left upper chest area.  She describes the pain is sharp and worse with breathing or with movement.  She took a nitroglycerin which did not seem to help and then went to the ER [facility] because of her past history.  [facility] did an EKG which is okay a troponin which is okay since they have a cardiologist there will to transfer here.  Patient does have an extensive history of heart disease but does say this pain is not like her heart pain in the past.    Patient is a 58 y.o. female presenting with chest pain.   History provided by:  Patient   used: No    Chest Pain   Pain location:  L lateral chest  Pain quality: sharp and tearing    Pain radiates to:  L shoulder  Pain severity:  Moderate  Onset quality:  Gradual  Duration:  4 hours  Timing:  Constant  Progression:  Worsening  Chronicity:  New  Context: breathing and movement    Context: not drug use, not eating, not intercourse, not lifting, not raising an arm, not at rest, not stress and not trauma    Relieved by:  Nothing  Worsened by:  Nothing  Ineffective treatments:  None tried  Associated symptoms: no abdominal pain, no AICD problem, no altered mental status, no anorexia, no anxiety, no back pain, no claudication, no cough, no diaphoresis, no dizziness, no dysphagia, no fatigue, no fever, no headache, no heartburn, no lower extremity edema, no nausea, no near-syncope, no numbness, no orthopnea, no palpitations, no PND, no shortness of breath, no syncope and no vomiting    Risk factors: coronary artery disease    Risk factors: no high cholesterol and no  hypertension        Review of Systems   Constitutional: Negative.  Negative for diaphoresis, fatigue and fever.   HENT: Negative.  Negative for trouble swallowing.    Respiratory: Negative.  Negative for cough and shortness of breath.    Cardiovascular: Positive for chest pain. Negative for palpitations, orthopnea, claudication, syncope, PND and near-syncope.   Gastrointestinal: Negative.  Negative for abdominal pain, anorexia, heartburn, nausea and vomiting.   Genitourinary: Negative.    Musculoskeletal: Negative.  Negative for back pain.   Neurological: Negative.  Negative for dizziness, numbness and headaches.   Hematological: Negative.    Psychiatric/Behavioral: Negative.    All other systems reviewed and are negative.      Past Medical History   Diagnosis Date   • Abnormal stress test    • Atrial flutter    • CAD (coronary artery disease)    • CAD in native artery      CABG x 3   • Diabetes mellitus    • Essential hypertension      Tricuspid valve insufficiency, unspecified etiology    • Hyperlipemia, mixed    • Hyperlipidemia    • Hypertension    • MI, old    • Mitral valve prolapse    • Near syncope    • Obesity, morbid    • Palpitations    • Paroxysmal SVT (supraventricular tachycardia)    • Paroxysmal SVT (supraventricular tachycardia)    • Pedal edema    • Precordial pain    • Rheumatoid arthritis    • Rheumatoid arthritis    • S/P CABG x 3        Allergies   Allergen Reactions   • Albuterol      High heart rate   • Codeine      Chest pain   • Sulfa Antibiotics Hives       Past Surgical History   Procedure Laterality Date   • Cardiac catheterization Left 06/18/2012     Intensify medical therapy   • Coronary stent placement  09/2009     X1 - Stent to LAD, Drug Eluting   • Replacement total knee Right    • Gallbladder surgery     • Appendectomy     • Coronary artery bypass graft  05/05/2002     LIMA to LAD, SVG to D1, SVG to OM1 - x3   • Cardiac catheterization Left 05/05/2002     surgery   • Cholecystectomy          Family History   Problem Relation Age of Onset   • Cancer Father    • Coronary artery disease Father    • Coronary artery disease Brother        Social History     Social History   • Marital status:      Spouse name: N/A   • Number of children: N/A   • Years of education: N/A     Social History Main Topics   • Smoking status: Former Smoker   • Smokeless tobacco: None   • Alcohol use No   • Drug use: No   • Sexual activity: Defer     Other Topics Concern   • None     Social History Narrative       Prior to Admission medications    Medication Sig Start Date End Date Taking? Authorizing Provider   simvastatin (ZOCOR) 40 MG tablet Take 40 mg by mouth Every Night.   Yes Historical Provider, MD   aspirin 81 MG EC tablet Take 81 mg by mouth Daily.    Historical Provider, MD   dronedarone (MULTAQ) 400 MG tablet Take 1 tablet by mouth 2 (Two) Times a Day With Meals. 12/27/16   Jagdeep Reyes MD   etanercept (ENBREL) 50 MG/ML solution prefilled syringe injection Inject 50 mg under the skin 1 (One) Time Per Week.    Historical Provider, MD   folic acid (FOLVITE) 1 MG tablet Take 1 mg by mouth Daily.    Historical Provider, MD   furosemide (LASIX) 20 MG tablet Take 20 mg by mouth Daily.    Historical Provider, MD   glyBURIDE (DIAbeta) 5 MG tablet Take 5 mg by mouth Daily With Breakfast.    Historical Provider, MD   isosorbide mononitrate (IMDUR) 30 MG 24 hr tablet Take 30 mg by mouth Daily.    Historical Provider, MD   losartan (COZAAR) 50 MG tablet Take 50 mg by mouth Daily.    Historical Provider, MD   methotrexate 2.5 MG tablet Take 2.5 mg by mouth 1 (One) Time Per Week. 10 TABLETS Q WEEK    Historical Provider, MD   METOPROLOL TARTRATE PO Take 50 mg by mouth 2 (Two) Times a Day.    Historical Provider, MD   pantoprazole (PROTONIX) 40 MG EC tablet Take 40 mg by mouth Daily. 2 PILLS QD    Historical Provider, MD   potassium chloride (K-DUR) 10 MEQ CR tablet Take 10 mEq by mouth Daily.    Historical Provider, MD    predniSONE (DELTASONE) 1 MG tablet Take 1 mg by mouth Every Night. 4 PILLS AT NIGHT    Historical Provider, MD   predniSONE (DELTASONE) 5 MG tablet Take 5 mg by mouth Every Morning.    Historical Provider, MD   rivaroxaban (XARELTO) 20 MG tablet Take 1 tablet by mouth Daily. 12/27/16   Jagdeep Reyes MD       Medications   sodium chloride 0.9 % flush 10 mL (not administered)   DOBUTamine (DOBUTREX) infusion 1000 mcg/mL (0 mcg/kg/min × 93.9 kg Intravenous Stopped 3/17/17 1647)   iopamidol (ISOVUE-370) 76 % injection 150 mL (150 mL Intravenous Given 3/17/17 1437)   Morphine sulfate (PF) injection 4 mg (4 mg Intravenous Given 3/17/17 1529)   ondansetron (ZOFRAN) injection 4 mg (4 mg Intravenous Given 3/17/17 1530)   perflutren (DEFINITY) 8.476 mg in sodium chloride 10 mL injection (5 mL Intravenous Given 3/17/17 1647)   metoprolol tartrate (LOPRESSOR) injection 5 mg (5 mg Intravenous Given 3/17/17 1655)       Vitals:    03/17/17 1801   BP: 132/75   Pulse: 72   Resp: 12   Temp:    SpO2: 94%         Objective   Physical Exam   Constitutional: She is oriented to person, place, and time. She appears well-developed and well-nourished.   HENT:   Head: Normocephalic and atraumatic.   Neck: Normal range of motion. Neck supple.   Cardiovascular: Normal rate and regular rhythm.    Pulmonary/Chest: Effort normal and breath sounds normal. She exhibits tenderness.   Abdominal: Soft. Bowel sounds are normal.   Musculoskeletal: Normal range of motion.   Neurological: She is alert and oriented to person, place, and time.   Skin: Skin is warm and dry.   Psychiatric: She has a normal mood and affect. Her behavior is normal.   Nursing note and vitals reviewed.      Procedures         Lab Results (last 24 hours)     Procedure Component Value Units Date/Time    POC Troponin, Rapid [[id number]]  (Normal) Collected:  03/17/17 1219    Specimen:  Blood Updated:  03/17/17 1230     Troponin I 0.00 ng/mL       Serial Number: [id number]    :  [id number]       CBC & Differential [[id number]] Collected:  03/17/17 1232    Specimen:  Blood Updated:  03/17/17 1347    Narrative:       The following orders were created for panel order CBC & Differential.  Procedure                               Abnormality         Status                     ---------                               -----------         ------                     CBC Auto Differential[[id number]]         Abnormal            Final result                 Please view results for these tests on the individual orders.    Comprehensive Metabolic Panel [[id number]]  (Abnormal) Collected:  03/17/17 1232    Specimen:  Blood Updated:  03/17/17 1304     Glucose 231 (H) mg/dL      BUN 19 mg/dL      Creatinine 0.49 (L) mg/dL      Sodium 139 mmol/L      Potassium 4.0 mmol/L      Chloride 104 mmol/L      CO2 25.0 mmol/L      Calcium 8.8 mg/dL      Total Protein 6.4 g/dL      Albumin 3.60 g/dL      ALT (SGPT) 20 U/L      AST (SGOT) 24 U/L      Alkaline Phosphatase 73 U/L      Total Bilirubin 0.9 mg/dL      eGFR Non African Amer 129 mL/min/1.73      Globulin 2.8 gm/dL      A/G Ratio 1.3 g/dL      BUN/Creatinine Ratio 38.8 (H)      Anion Gap 10.0 mmol/L     D-dimer, Quantitative [[id number]]  (Abnormal) Collected:  03/17/17 1232    Specimen:  Blood Updated:  03/17/17 1349     D-Dimer, Quantitative 1.44 (H) mg/L (FEU)     Narrative:       Reference Range is 0-0.50 mg/L FEU. However, results <0.50 mg/L FEU tends to rule out DVT or PE. Results >0.50 mg/L FEU are not useful in predicting absence or presence of DVT or PE.    BNP [[id number]]  (Normal) Collected:  03/17/17 1232    Specimen:  Blood Updated:  03/17/17 1314     proBNP 295.0 pg/mL     CBC Auto Differential [[id number]]  (Abnormal) Collected:  03/17/17 1232    Specimen:  Blood Updated:  03/17/17 1347     WBC 15.33 (H) 10*3/mm3      RBC 4.18 (L) 10*6/mm3      Hemoglobin 12.8 g/dL      Hematocrit 39.8 %      MCV 95.2 fL      MCH 30.6 pg      MCHC 32.2 (L) g/dL      RDW 15.7 (H)  %      RDW-SD 54.2 (H) fl      MPV 12.0 fL      Platelets 193 10*3/mm3      Neutrophil % 86.5 (H) %      Lymphocyte % 8.3 (L) %      Monocyte % 4.4 %      Eosinophil % 0.3 %      Basophil % 0.1 %      Immature Grans % 0.4 %      Neutrophils, Absolute 13.26 (H) 10*3/mm3      Lymphocytes, Absolute 1.27 10*3/mm3      Monocytes, Absolute 0.68 10*3/mm3      Eosinophils, Absolute 0.04 10*3/mm3      Basophils, Absolute 0.02 10*3/mm3      Immature Grans, Absolute 0.06 (H) 10*3/mm3           CT Angiogram Chest With Contrast   Final Result   1. No pulmonary emboli.   2. Prior mediastinal surgery. Cardiomegaly. No thoracic aortic aneurysm   or dissection.   3. Hypoventilated lungs. Bibasilar atelectasis.   This report was finalized on 03/17/2017 15:59 by Dr. Kay Emanuel MD.      XR Chest 1 View   Final Result   Stable single view chest.   This report was finalized on 03/17/2017 14:16 by Dr. Huseyin Hopkins MD.          ED Course  ED Course   Comment By Time   Told patient of negative tests to this point.  I believe this is musculoskeletal but she is still concerned about heart so we will stress test. Luiz Lizama Jr., MD 03/17 2418   I told the patient her stress test was negative.  There is no signs of cardiac disease so we will treat her for chest wall or pleuritic type of pain.  She is discharged in stable condition. Luiz Lizama Jr., MD 03/17 8287          MDM  Number of Diagnoses or Management Options  Chest wall pain: new and requires workup     Amount and/or Complexity of Data Reviewed  Clinical lab tests: reviewed and ordered  Tests in the radiology section of CPT®: ordered and reviewed  Tests in the medicine section of CPT®: ordered and reviewed    Risk of Complications, Morbidity, and/or Mortality  Presenting problems: moderate  Diagnostic procedures: moderate  Management options: moderate    Patient Progress  Patient progress: stable      Final diagnoses:   Chest wall pain        Luiz Lizama Jr.,  MD  03/17/17 1822     Unit Name: [address] Phone Number: [phone]

## 2024-04-19 NOTE — BH DISCHARGE NOTE NURSING/SOCIAL WORK/PSYCH REHAB - PATIENT PORTAL LINK FT
You can access the FollowMyHealth Patient Portal offered by [facility] by registering at the following website: [email or web address]. By joining [facility]’s FollowMyHealth portal, you will also be able to view your health information using other applications (apps) compatible with our system.

## 2024-04-19 NOTE — BH DISCHARGE NOTE NURSING/SOCIAL WORK/PSYCH REHAB - NSDCADDINFO1FT_PSY_ALL_CORE
Please arrive with your insurance and identification card. It has also been shared that you have a $22.80 copay at the time of your visit. The therapist will be discussing your future appointments with therapy and psychiatrist.

## 2024-04-19 NOTE — BH DISCHARGE NOTE NURSING/SOCIAL WORK/PSYCH REHAB - NSBHDCADDR1FT_A_CORE
[address] [place] [address] [address] [place] [address], [place] [phone]

## 2024-04-19 NOTE — BH DISCHARGE NOTE NURSING/SOCIAL WORK/PSYCH REHAB - NSBHDCAGENCY1FT_PSY_A_CORE
[facility]   Intake : Marisela [phone] [facility]   Intake Team: Jesusita Nicholson

## 2024-04-19 NOTE — BH DISCHARGE NOTE NURSING/SOCIAL WORK/PSYCH REHAB - NSDCPRGOAL_PSY_ALL_CORE
Upon approach, patient was willing to meet with writer to discuss recovery progress. Patient was calm and pleasant throughout meeting. When asked about plans for after discharge, patient stated she's looking forward to seeing her family. Patient was knowledgeable on outpatient treatment and stated her intentions to follow it. Patient denies AH/VH/SI/HI. During hospitalization, patient was active in the milieu and attended many groups. In groups patient was meaningfully engaged and participated. Patient is kempt and has been observed to be dressed casually. Patient was compliant in safety planning and was provided with a Press-[employer] survey. By the end of hospitalization, patient did meet her goal of being able to identify two coping skills for suicidality. Patient has also demonstrated better insight and judgement, as well as future-oriented thinking.

## 2024-04-19 NOTE — CHART NOTE - NSCHARTNOTEFT_GEN_A_CORE
Pt with concern of possible ringworm on her face, below and lateral to L orbit. Denies pruritis.  General: NAD  VSS  Skin: No rash, lesions, scars noted on face.  A/P   18F admitted to [facility] for MDD with concern of facial ringworm. Pt shown an enlarged photo of her face and agrees there is rash/ lesion s present. Pt says the area was worse 2 days ago, but has since improved on it's own. Pt advised, if rash/ lesion returns or skin becomes pruritic, to let staff know. At this time, there is not active skin disease to treat and pt is in agreement.
Screening Medical Evaluation    Patient Admitted from: [facility] ED     [facility] admitting diagnosis: Major depressive disorder with single episode      PAST MEDICAL & SURGICAL HISTORY:        Allergies    No Known Allergies    Intolerances          Social History:       FAMILY HISTORY:        MEDICATIONS  (STANDING):    MEDICATIONS  (PRN):  haloperidol     Tablet 5 milliGRAM(s) Oral every 6 hours PRN agitation  haloperidol    Injectable 5 milliGRAM(s) IntraMuscular every 6 hours PRN severe agitation  LORazepam     Tablet 2 milliGRAM(s) Oral every 6 hours PRN agitation/anxiety  LORazepam   Injectable 2 milliGRAM(s) IntraMuscular every 6 hours PRN severe agitation  melatonin. 3 milliGRAM(s) Oral at bedtime PRN Insomnia        Vital Signs Last 24 Hrs  T(C): 36.8 (09 Apr 2024 20:42), Max: 36.8 (09 Apr 2024 15:20)  T(F): 98.2 (09 Apr 2024 20:42), Max: 98.3 (09 Apr 2024 15:20)  HR: 73 (09 Apr 2024 15:20) (65 - 85)  BP: 107/71 (09 Apr 2024 15:20) (106/69 - 128/76)  BP(mean): --  RR: 16 (09 Apr 2024 17:56) (16 - 18)  SpO2: 99% (09 Apr 2024 17:56) (96% - 99%)      CAPILLARY BLOOD GLUCOSE            PHYSICAL EXAM:  GENERAL: NAD  HEAD:  Atraumatic, Normocephalic  EYES: EOMI, PERRLA, conjunctiva and sclera clear  NECK: Supple, No JVD  CHEST/LUNG: Clear to auscultation bilaterally; No wheeze  HEART: Regular rate and rhythm; No murmurs, rubs, or gallops  ABDOMEN: Positive BS, Soft, Nontender.   EXTREMITIES:  2+ Peripheral Pulses, No clubbing, cyanosis, or edema  PSYCH: Calm and cooperative  NEUROLOGY: non-focal  SKIN: No rashes or lesions seen on exposed skin     LABS:                        13.9   7.02  )-----------( 304      ( 09 Apr 2024 01:39 )             40.7     04-09    136  |  100  |  14.3  ----------------------------<  87  4.1   |  22.0  |  0.65    Ca    8.9      09 Apr 2024 01:39    TPro  7.8  /  Alb  4.2  /  TBili  0.6  /  DBili  x   /  AST  27  /  ALT  15  /  AlkPhos  67  04-09          Urinalysis Basic - ( 09 Apr 2024 04:35 )    Color: Yellow / Appearance: Clear / SG: >1.030 / pH: x  Gluc: x / Ketone: Trace mg/dL  / Bili: Negative / Urobili: 1.0 mg/dL   Blood: x / Protein: Trace mg/dL / Nitrite: Negative   Leuk Esterase: Negative / RBC: x / WBC x   Sq Epi: x / Non Sq Epi: x / Bacteria: x        RADIOLOGY & ADDITIONAL TESTS:      Assessment and Plan:  18F admitted to [facility] for Major depressive disorder with single episode.  No PMHx.  Pt seen for medical screening evaluation. Patient has no acute complaints at this time. Patient denies fever, chills, headache, dizziness, lightheadedness, N/V, SOB, cough, congestion, chest pain, abdominal pain, dysuria, hematuria, diarrhea, constipation. Physical exam unremarkable, VSS. Labs above. 4/9 EKG NSR @73b/ min, QT/ QTC= 406/ 447.      1.) Major depressive disorder with single episode: Plan per primary team

## 2024-04-19 NOTE — BH DISCHARGE NOTE NURSING/SOCIAL WORK/PSYCH REHAB - NSDCPRRECOMMEND_PSY_ALL_CORE
Psych rehab recommends that patient maintain medication compliance and utilize identified coping skills to manage symptoms. Psych rehab also recommends that patient follow outpatient treatment as prescribed for ongoing medication management, support and psychotherapy.

## 2024-04-20 RX ADMIN — ARIPIPRAZOLE 5 MILLIGRAM(S): 15 TABLET ORAL at 09:27

## 2024-04-20 RX ADMIN — Medication 50 MILLIGRAM(S): at 15:58

## 2024-04-20 RX ADMIN — Medication 3 MILLIGRAM(S): at 21:34

## 2024-04-20 RX ADMIN — Medication 20 MILLIGRAM(S): at 09:27

## 2024-04-20 RX ADMIN — Medication 50 MILLIGRAM(S): at 21:34

## 2024-04-21 RX ADMIN — Medication 20 MILLIGRAM(S): at 09:20

## 2024-04-21 RX ADMIN — Medication 3 MILLIGRAM(S): at 20:50

## 2024-04-21 RX ADMIN — Medication 50 MILLIGRAM(S): at 20:50

## 2024-04-21 RX ADMIN — ARIPIPRAZOLE 5 MILLIGRAM(S): 15 TABLET ORAL at 09:19

## 2024-04-22 PROCEDURE — 90853 GROUP PSYCHOTHERAPY: CPT

## 2024-04-22 PROCEDURE — 99232 SBSQ HOSP IP/OBS MODERATE 35: CPT

## 2024-04-22 RX ORDER — LANOLIN ALCOHOL/MO/W.PET/CERES
5 CREAM (GRAM) TOPICAL AT BEDTIME
Refills: 0 | Status: DISCONTINUED | OUTPATIENT
Start: 2024-04-22 | End: 2024-04-26

## 2024-04-22 RX ADMIN — Medication 20 MILLIGRAM(S): at 08:32

## 2024-04-22 RX ADMIN — Medication 5 MILLIGRAM(S): at 22:37

## 2024-04-22 RX ADMIN — ARIPIPRAZOLE 5 MILLIGRAM(S): 15 TABLET ORAL at 08:32

## 2024-04-23 PROCEDURE — 90853 GROUP PSYCHOTHERAPY: CPT

## 2024-04-23 PROCEDURE — 99232 SBSQ HOSP IP/OBS MODERATE 35: CPT

## 2024-04-23 RX ADMIN — Medication 50 MILLIGRAM(S): at 18:51

## 2024-04-23 RX ADMIN — Medication 20 MILLIGRAM(S): at 08:41

## 2024-04-23 RX ADMIN — Medication 5 MILLIGRAM(S): at 20:59

## 2024-04-23 RX ADMIN — ARIPIPRAZOLE 5 MILLIGRAM(S): 15 TABLET ORAL at 08:41

## 2024-04-24 PROCEDURE — 99232 SBSQ HOSP IP/OBS MODERATE 35: CPT

## 2024-04-24 PROCEDURE — 90853 GROUP PSYCHOTHERAPY: CPT

## 2024-04-24 RX ORDER — FLUOXETINE HCL 10 MG
20 CAPSULE ORAL ONCE
Refills: 0 | Status: COMPLETED | OUTPATIENT
Start: 2024-04-24 | End: 2024-04-24

## 2024-04-24 RX ORDER — FLUOXETINE HCL 10 MG
40 CAPSULE ORAL DAILY
Refills: 0 | Status: DISCONTINUED | OUTPATIENT
Start: 2024-04-25 | End: 2024-04-26

## 2024-04-24 RX ADMIN — Medication 5 MILLIGRAM(S): at 21:24

## 2024-04-24 RX ADMIN — Medication 20 MILLIGRAM(S): at 09:09

## 2024-04-24 RX ADMIN — Medication 20 MILLIGRAM(S): at 13:27

## 2024-04-24 RX ADMIN — ARIPIPRAZOLE 5 MILLIGRAM(S): 15 TABLET ORAL at 09:09

## 2024-04-24 NOTE — BH PSYCHOLOGY - CLINICIAN PSYCHOTHERAPY NOTE - NSBHPTASSESSDT_PSY_A_CORE
16-Apr-2024 10:30
19-Apr-2024 10:00
23-Apr-2024 10:10
12-Apr-2024 10:30
17-Apr-2024 08:30
10-Apr-2024 12:00
24-Apr-2024 08:15

## 2024-04-24 NOTE — BH PSYCHOLOGY - CLINICIAN PSYCHOTHERAPY NOTE - NSBHPSYCHOLRESPONSE_PSY_A_CORE
Symptoms reduced/Coping skills acquired/Insight displayed/Accepted support
Coping skills acquired/Insight displayed/Accepted support
Symptoms reduced/Coping skills acquired/Insight displayed/Accepted support
Coping skills acquired/Insight displayed/Accepted support

## 2024-04-24 NOTE — BH PSYCHOLOGY - CLINICIAN PSYCHOTHERAPY NOTE - NSBHPSYCHOLNARRATIVE_PSY_A_CORE FT
Patient was alert, cooperative, and in control. Oriented x3. Casually dressed, fairly groomed. Maintained good eye contact. Speech normal in production, rate, volume, and tone. No abnormal psychomotor behavior. Mood "good" with congruent affect. Thought process logical, goal-directed. Thought content relevant to discussion. Denied auditory/visual hallucinations and suicidal/homicidal ideation, intent, plan, and urges to self-harm. Impulse control intact. Insight and judgment fair. Patient committed to remaining safe on the unit and agreed to inform staff if urges to self-harm or suicidal ideation became active.     Session focused on consolidating treatment gains, terminating treatment relationship effectively, and coping ahead for effective discharge via safety planning. Pt engaged in discussion of skills they can use to manage transition home, including interpersonal effectiveness and distress tolerance.  With encouragement, pt identified warning signs, coping skills, and external supports that they can use to maintain safety and stability outside of the hospital after discharge. Discussion also centered on how pt can help keep the environment safe. Please see  Safety Plan for further detail. Committed to remaining safe on the unit and informing staff if unable to manage behaviors. 
Patient was alert, cooperative, and in control. Oriented x3. Casually dressed, fairly groomed. Maintained good eye contact. Speech normal in production, rate, volume, and tone. No abnormal psychomotor behavior. Mood "okay" with congruent affect. Thought process logical, goal-directed. Thought content relevant to discussion. Denied auditory/visual hallucinations and homicidal ideation, intent, or plan. Reported continued SI, with urges to hang herself in her bedroom; however, pt denied intent and reported engaging in coping behaviors (e.g., paced breathing, opposite action) when urges occur. Impulse control intact. Insight and judgment fair. Patient committed to remaining safe on the unit and agreed to inform staff if urges to self-harm or suicidal ideation became active.     Focus of session is on continued targeting of SI through DBT strategies such as behavior chain and solution analyses, commitment strategies, skills training and validation. Pt reported that she has engaged in TIP skills when experiencing distress on the unit, and that these skills were successful in preventing her from engaging in urges. Writer provided validation and praised. Writer then provided continued psychoeducation on the difference between distress tolerance skills (helping to get through a crisis) and emotion regulation skills (changing emotions / problem solving). DBT skills reviewed included opposite action and self-soothe. Pt additionally reported that she reviewed radical acceptance and has been noting which aspects of the skill are more challenging. Writer and patient will review together at next session. Committed to remaining safe on the unit and informing staff if unable to manage behaviors. 
Patient was alert, cooperative, and in control. Oriented x3. Casually dressed, fairly groomed. Maintained good eye contact. Speech normal in production, rate, volume, and tone. No abnormal psychomotor behavior. Mood "good" with congruent affect. Thought process logical, goal-directed. Thought content relevant to discussion. Denied auditory/visual hallucinations and homicidal ideation, intent, and plan. Impulse control intact. Insight and judgment fair. Reported passive suicide ideation consistent with baseline, but reported no intent or plan for suicide or self-harm. Patient committed to remaining safe on the unit and agreed to inform staff if urges to self-harm or suicidal ideation became active.     Focus of session is on continued targeting of SI through DBT strategies such as behavior chain and solution analyses, commitment strategies, skills training and validation. Session began with mindfulness outdoors; patient noticed distractions in the environment and feelings of anxiety, but was able to return to body sensations to re-focus on the present. Patient reported ruminating on suicidal thoughts last night while journaling, which lead to a discussion of the ACT Choice Point. Writer provided psychoeducation on the Choice Point, and how we often need to keep choosing the path of what is effective, even when we feel as if we have no control; patient responded well to the psychoeducation. Writer additionally noticed that patient was consistently able to notice distractions in the environment and effectively return to the discussion, which was difficult in previous sessions. Patient completed HW and identified her family and healthy relationships as her most important value; writer asked patient to consider a specific goal in line with this value for HW. Committed to remaining safe on the unit and informing staff if unable to manage behaviors. 
Patient was alert, cooperative, and in control. Oriented x3. Casually dressed, fairly groomed. Maintained good eye contact. Speech normal in production, rate, volume, and tone. No abnormal psychomotor behavior. Mood was depressed with congruent affect. Thought process logical, goal-directed. Thought content relevant to discussion. Denied auditory/visual hallucinations. Denied homicidal ideation, intent, and plan. Reported passive suicidal ideation ("I wish that I were dead) but denied intent or plan for suicide or self-harm. Impulse control intact. Insight and judgment fair.     Session focused on building rapport and identifying factors that contributed to hospitalization. Pt reported an escalation of depressive symptoms after the deaths of her uncle and grandmother, as well as preoccupation with death ("I constantly think about death"). Pt also reported symptoms of panic when noticing physiological arousal (e.g., escalated HR, breathlessness, blurry vision). Remainder of session was spent conducting behavior chain analysis on events leading to current hospitalization.  Pt identified high vulnerabilities (e.g., not eating, not speaking with others, not leaving bed) and urges to die by suicide after parents checked in on her mental state. Writer provided validation and oriented pt to DBT treatment. Pt was oriented to DBT Diary Card and agrees to track urges, target behaviors and use of DBT skills. Writer also oriented pt to CAPS protocol and pt committed to safety on the unit. Will conduct solution analysis during next session.
Patient was alert, cooperative, and in control. Oriented x3. Casually dressed, fairly groomed. Maintained good eye contact. Speech normal in production, rate, volume, and tone. No abnormal psychomotor behavior. Mood depressed with congruent affect. Thought process logical, goal-directed. Thought content relevant to discussion. Denied auditory/visual hallucinations and homicidal ideation, intent, and plan. Reported passive suicide ideation ("I wish I wasn't here anymore") but denied intent or plan for suicide. Patient committed to remaining safe on the unit and agreed to inform staff if urges to self-harm or suicidal ideation became active.  Impulse control intact. Insight and judgment fair.     Focus of session is on continued targeting of SI through DBT strategies such as behavior chain and solution analyses, commitment strategies, skills training and validation. Pt reported that she attempted to used paced breathing and opposite action when experiencing stress on the unit (e.g., interpersonal stress with other patients). Writer coached pt in properly using paced breathing (e.g., longer out breath as opposed to longer in breath) and provided greater psychoeducation on opposite action. Writer additionally provided psychoeducation on biosocial theory of emotion dysregulation and provided a handout on radical acceptance. Pt is to practice DBT skills over the weekend and track mood with diary card. Committed to remaining safe on the unit and informing staff if unable to manage behaviors. 
Patient was alert, cooperative, and in control. Oriented x3. Casually dressed, fairly groomed. Maintained good eye contact. Speech normal in production, rate, volume, and tone. No abnormal psychomotor behavior. Mood "good" with congruent affect. Thought process logical, goal-directed. Thought content relevant to discussion. Denied auditory/visual hallucinations and homicidal ideation, intent, and plan. Reported a reduction in SI ("I felt scared without wanting to die") and denied intent or plan for suicide or self-harm. Impulse control intact. Insight and judgment fair. Patient committed to remaining safe on the unit and agreed to inform staff if urges to self-harm or suicidal ideation became active.     Focus of session is on continued targeting of SI through DBT strategies such as behavior chain and solution analyses, commitment strategies, skills training and validation. Writer guided pt in using a mindful grounding technique (5-4-3-2-1) outdoors; pt reported feeling "content and calm" after the activity and noticed an increase in motivation. Writer provided psychoeducation on the thoughts/feelings/behavior triangle and acknowledged that behavioral activation (opposite action) can positively impact thoughts and feelings. Pt reported that she has engaged in TIP skills on the unit, which have been helpful in preventing engagement in target behaviors. Writer then provided psychoeducation on willingness and accumulating positive emotions in the long term. Pt is to review values and priorities list for HW. Committed to remaining safe on the unit and informing staff if unable to manage behaviors.  
Patient was alert, cooperative, and in control. Oriented x3. Casually dressed, fairly groomed. Maintained good eye contact. Speech normal in production, rate, volume, and tone. No abnormal psychomotor behavior. Mood "better" with congruent affect. Thought process logical, goal-directed. Thought content relevant to discussion. Denied auditory/visual hallucinations and homicidal ideation, intent, plan. No urges to self-harm. Reported continued reduction in passive suicide ideation and denied plan or intent. Impulse control intact. Insight and judgment fair. Patient committed to remaining safe on the unit and agreed to inform staff if urges to self-harm or suicidal ideation became active.     Focus of session is on continued targeting of SI through DBT strategies such as behavior chain and solution analyses, commitment strategies, skills training and validation. HW was reviewed. Pt identified several values (e.g., building and maintaining relationships, achieving success, trying new things), but noted "pressure" to check off values that she felt she "morally should have". Writer and pt discussed how to identify values and specific goals that are authentically hers.     Remainder of session spent chaining nightly anxiety attacks; pt reported that she is unable to identify specific triggers, but also reported greater success with using skills to fight anxiety attacks (e.g., laying down and using paced breathing). HW was assigned to monitor and chain nightly anxiety attacks. Writer ended session by asking what pt continues to worry about regarding discharge; pt stated that she is still concerned about whether she can "fight the urge" when suicidal thoughts occur outside the hospital. Committed to remaining safe on the unit and informing staff if unable to manage behaviors.

## 2024-04-24 NOTE — BH PSYCHOLOGY - CLINICIAN PSYCHOTHERAPY NOTE - NSBHPSYCHOLPROBS_PSY_ALL_CORE
Anxiety/Depression/Self Injurious Behavior/Suicidality
Anxiety/Depression/Impulsivity/Self Injurious Behavior/Suicidality
Depression/Impulsivity/Self Injurious Behavior/Suicidality

## 2024-04-24 NOTE — BH PSYCHOLOGY - CLINICIAN PSYCHOTHERAPY NOTE - NSBHPSYCHOLGOALS_PSY_A_CORE
Decrease symptoms/Improve social/vocational/coping skills/Psychoeducation
Decrease symptoms/Assessment/Improve social/vocational/coping skills/Psychoeducation
Decrease symptoms/Assessment/Improve social/vocational/coping skills/Prevent relapse/Psychoeducation
Decrease symptoms/Assessment/Improve social/vocational/coping skills/Psychoeducation
Decrease symptoms/Improve social/vocational/coping skills/Psychoeducation
Decrease symptoms/Assessment/Improve social/vocational/coping skills/Psychoeducation
Decrease symptoms/Assessment/Improve social/vocational/coping skills/Psychoeducation/Treatment compliance

## 2024-04-24 NOTE — BH PSYCHOLOGY - CLINICIAN PSYCHOTHERAPY NOTE - NSTXSUICIDGOAL_PSY_ALL_CORE
Will identify and utilize 2 coping skills
Be able to state 3 reasons for living
Will identify and utilize 2 coping skills
Implemented All Universal Safety Interventions:  [place] to call system. Call bell, personal items and telephone within reach. Instruct patient to call for assistance. Room bathroom lighting operational. Non-slip footwear when patient is off stretcher. Physically safe environment: no spills, clutter or unnecessary equipment. Stretcher in lowest position, wheels locked, appropriate side rails in place.

## 2024-04-24 NOTE — BH PSYCHOLOGY - CLINICIAN PSYCHOTHERAPY NOTE - TOKEN PULL-DIAGNOSIS
Primary Diagnosis:  Major depression [F32.9]        Problem Dx:   

## 2024-04-24 NOTE — BH PSYCHOLOGY - CLINICIAN PSYCHOTHERAPY NOTE - NSBHPSYCHOLINT_PSY_A_CORE
Cognitive/behavioral therapy/Dialectical  Behavioral Therapy (DBT)/Supported coping skills/Supportive therapy
Dialectical  Behavioral Therapy (DBT)/Supported coping skills/Supportive therapy
Cognitive/behavioral therapy/Dialectical  Behavioral Therapy (DBT)/Supported coping skills/Supportive therapy/other...
Cognitive/behavioral therapy/Dialectical  Behavioral Therapy (DBT)/Supported coping skills/Supportive therapy
Cognitive/behavioral therapy/Dialectical  Behavioral Therapy (DBT)/Supported coping skills/Supportive therapy
Cognitive/behavioral therapy/Dialectical  Behavioral Therapy (DBT)/Supportive therapy
Cognitive/behavioral therapy/Dialectical  Behavioral Therapy (DBT)/Supported coping skills/Supportive therapy

## 2024-04-25 PROCEDURE — 99232 SBSQ HOSP IP/OBS MODERATE 35: CPT

## 2024-04-25 PROCEDURE — 90853 GROUP PSYCHOTHERAPY: CPT

## 2024-04-25 RX ORDER — ARIPIPRAZOLE 15 MG/1
1 TABLET ORAL
Qty: 30 | Refills: 0
Start: 2024-04-25 | End: 2024-05-24

## 2024-04-25 RX ORDER — FLUOXETINE HCL 10 MG
1 CAPSULE ORAL
Qty: 30 | Refills: 0
Start: 2024-04-25 | End: 2024-05-24

## 2024-04-25 RX ORDER — LANOLIN ALCOHOL/MO/W.PET/CERES
5 CREAM (GRAM) TOPICAL ONCE
Refills: 0 | Status: COMPLETED | OUTPATIENT
Start: 2024-04-25 | End: 2024-04-25

## 2024-04-25 RX ORDER — HYDROXYZINE HCL 10 MG
1 TABLET ORAL
Qty: 90 | Refills: 0
Start: 2024-04-25 | End: 2024-05-24

## 2024-04-25 RX ADMIN — ARIPIPRAZOLE 5 MILLIGRAM(S): 15 TABLET ORAL at 08:37

## 2024-04-25 RX ADMIN — Medication 5 MILLIGRAM(S): at 22:05

## 2024-04-25 RX ADMIN — Medication 5 MILLIGRAM(S): at 20:17

## 2024-04-25 RX ADMIN — Medication 40 MILLIGRAM(S): at 08:37

## 2024-04-25 NOTE — BH TREATMENT PLAN - NSTXSUICIDINTERMD_PSY_ALL_CORE
Psychopharmacology  Psychoeducation

## 2024-04-25 NOTE — BH PSYCHOLOGY - GROUP THERAPY NOTE - NSBHPSYCHOLGRPTYPE_PSY_A_CORE
DBT Life Skills
Cognitive Behavioral Coping Skills
DBT Life Skills
Cognitive Behavioral Coping Skills
DBT Life Skills

## 2024-04-25 NOTE — BH TREATMENT PLAN - NSCMSPTSTRENGTHS_PSY_ALL_CORE
Expressive of emotions/Intact family/Positive attitude/Supportive family

## 2024-04-25 NOTE — BH INPATIENT PSYCHIATRY DISCHARGE NOTE - OTHER PAST PSYCHIATRIC HISTORY (INCLUDE DETAILS REGARDING ONSET, COURSE OF ILLNESS, INPATIENT/OUTPATIENT TREATMENT)
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 There is no collateral contact information provided. She has been experiencing symptoms of depression including poor sleep, appetite, and motivation with anhedonia. Her uncle  unexpectedly in August and her Grandmother  three months later. She has been going to a grief group for the past four months, but otherwise has no treatment. She has been having a hard time and saying to herself "just do it and get it over with." She saw a psychiatrist for a year at age 14, but not recently. She has one previous suicide attempt prior to this overdose about 18 months ago via overdose on Benadryl after which she called the Suicide Hotline. She drinks alcohol and smokes Cannabis to help her sleep and to increase her appetite. She has no reported history of trauma, aggression, or legal issues. The patient has [employer].

## 2024-04-25 NOTE — BH PSYCHOLOGY - GROUP THERAPY NOTE - NSPSYCHOLGRPDBTPT_PSY_A_CORE FT
DBT Group is a group in which patients learn skills to better manage their emotions and behaviors. Group begins with a mindfulness practice so that patients have an opportunity to practice observing their internal and external experiences. Following the mindfulness exercise the group learns new skills in a didactic format. Group today focused on the “distress tolerance” module, which are skills to help patients manage their crisis urges, and not make their problems worse. Specifically, patients learned the “WISE MIND ACCEPTS” skill, which is an acronym for ways that patients can distract through activities, contributing, comparisons, pushing away, changing thoughts, and sensations. Patients were encouraged to think about examples of ways to utilize these skills while in the hospital and upon discharge 
DBT Group is a group in which patients learn skills to better manage their emotions and behaviors. Group begins with a mindfulness practice so that patients have an opportunity to practice observing their internal and external experiences. Following the mindfulness exercise the group learns new skills in a didactic format. Group today focused on the “distress tolerance” module, which are skills to help patients manage their crisis urges. Specifically, patients learned “TIPP” skills, which are skills to use when patients are highly distressed (at least 70/100) and are unable to think effectively to use other skills. These skills involve “Tipping” body chemistry through using temperature, intense exercise, paced breathing and progressive muscle relaxation. Each skill was reviewed and patients practiced progressive muscle relaxation and paced breathing in the session. Patients were encouraged to practice these skills while on the unit. 
DBT skills generalization group is a group in which patients review the skill taught the day before, and patients have the opportunity to troubleshoot the skill, engage in more practice, and share their experience using the skill. Today’s skills review group focused on the skills of “check the facts.” Check the facts” is about being more realistic about interpretations and assumptions and challenging them appropriately to think more rationally.  encouraged patients to share examples and leader as well as fellow participants provided helpful feedback and support. 
DBT Group is a group in which patients learn skills to better manage their emotions and behaviors. Group begins with a mindfulness practice so that patients have an opportunity to practice observing their internal and external experiences.  Following the mindfulness exercise the group learns new skills in a didactic format. Group today focused on the “emotion regulation” module.  Specifically, patients learned about the skill of Problem Solving, which is a method of managing difficult situations when an emotion is justified by the situation.  Patients were taught the seven steps of problem solving and provided with an example of a situation in which the skill would be useful.  Patients were also asked to think about when the skill would be helpful in their lives and how to apply the steps. Patients were also provided with a worksheet in order to help them practice the skill.
DBT skills generalization group is a group in which patients review the skill taught the day before, and patients have the opportunity to troubleshoot the skill, engage in more practice, and share their experience using the skill. Today’s skills review group focused on the distress tolerance skills of “STOP” and “Pros and Cons.” Patients shared examples of how they practiced these skills to manage intense emotions and refrain from acting on ineffective urges. Feedback was provided and patients discussed situations they anticipate in the future when these skills will be helpful. 
DBT Group is a group in which patients learn skills to better manage their emotions and behaviors. Group begins with a mindfulness practice so that patients have an opportunity to practice observing their internal and external experiences.  Following the mindfulness exercise the group learns new skills in a didactic format. Group today focused on the “emotion regulation” module.  Specifically, patients learned the skill of “check the facts.” “Check the facts” is about being more realistic about interpretations and assumptions and challenging them appropriately to think more rationally.  provided an example of checking the facts, and patients were encouraged to think about how these skills, and were assigned homework to practice. 
DBT skills generalization group is a group in which patients review the skill taught the day before, and patients have the opportunity to troubleshoot the skill, engage in more practice, and share their experience using the skill. Today’s skills review group focused on the Wise Mind ACCEPTS skills, which are skills to distract from crises urges, and include using activities, contributions, comparisons, changing emotions, pushing away, changing thoughts and using sensations. 
DBT Group is a group in which patients learn skills to better manage their emotions and behaviors. Group today focused on the “mindfulness” module, which are skills to help patients increase their awareness of their present experience without judgment. Pts were taught the “what” skills (observe, describe, participate) and “how” skills (non-judgmentally, effectively, and one-mindfully) of mindfulness, and engaged in a variety of mindfulness exercises to practice the skills, and shared observations at the end of each activity.

## 2024-04-25 NOTE — BH PSYCHOLOGY - GROUP THERAPY NOTE - NSBHPSYCHOLASSESSPROV_PSY_A_CORE
Licensed Psychologist
Licensed Psychologist and Psychology Trainee
Licensed Psychologist

## 2024-04-25 NOTE — BH INPATIENT PSYCHIATRY DISCHARGE NOTE - HPI (INCLUDE ILLNESS QUALITY, SEVERITY, DURATION, TIMING, CONTEXT, MODIFYING FACTORS, ASSOCIATED SIGNS AND SYMPTOMS)
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 Pt states that she has been feeling increasingly overwhelmed and depressed, decided to take pills in her room while her mother was sleeping. Pt states that her Uncle passed unexpectedly in August and her grandmother  3 months later, has been having a very hard time since then. States she hears herself saying "just do it and get it over with" in her head. Furthermore, tore her labrum recently and has not been able to go to the gym which was a coping mechanism for her. States that she has suicidal thoughts every day, despite attending grief support groups once per week for about 4 weeks. Pt endorses not eating or sleeping well for the past few days. increasingly amotivated anhedonic. states that on the day of the attempt she did not get out of bed or take care of herself at all. Pt has one prior suicide attempt about 1.5 years ago, attempted with Benadryl overdose and called suicide hotline. Did not receive medical attention following this attempt. Furthermore, pt states she saw a psychiatrist for about one year at the age of 14 but has not sought any medical attention for her mental health since then, does not take any medication. .drinks alcohol about weekly ~5 shots of hard liquor. (no history of complicated withdrawal or symptoms of severe withdrawal eg seizures delirium tremens). smokes cannabis occasional one blunt for sleep and appetite aid. no tobacco. Acetaminophen 4 hour level from 93.2 to 60.8 ug/ml, medically cleared for psychiatric admission.

## 2024-04-25 NOTE — BH TREATMENT PLAN - NSTXSUICIDGOAL_PSY_ALL_CORE
Will identify and utilize 2 coping skills
Will identify and utilize 2 coping skills
Will develop a suicide prevention/safety plan

## 2024-04-25 NOTE — BH PSYCHOLOGY - GROUP THERAPY NOTE - NSPSYCHOLGRPDBTPT_PSY_A_CORE
stable mood and affect in group/no self-destructive impulses/behaviors/Patient able to identify mood states/other...

## 2024-04-25 NOTE — BH PSYCHOLOGY - GROUP THERAPY NOTE - NSPSYCHOLGRPDBTINT_PSY_A_CORE
reviewed distress tolerance, skills homework
other...
other...
reviewed distress tolerance, skills homework
other...
review emotion regulation homework

## 2024-04-25 NOTE — BH TREATMENT PLAN - NSTXSUICIDINTERPR_PSY_ALL_CORE
Id and utilize 2 or more coping skills to address SI.
Id and utilize 2 or more coping skills to mitigate SI.
Patient’s psychiatric rehabilitation goal is to meet with staff individually and attend psychiatric rehabilitation groups, in order for patient to identify 2 healthy coping skills for better symptom management and sustained recovery within 7 days.

## 2024-04-25 NOTE — BH TREATMENT PLAN - NSTXSUICIDINTERRN_PSY_ALL_CORE
Provide education on coping skills that aid in mitigating suicidal thoughts; encourage use of coping skills after education is provided by staff when pt is experiencing urges to engage in self-injurious behavior; redirect as needed/indicated
assess for SI/urges for SIB, provide emotional support, encourage vocalization of thoughts/feelings, encourage attendance of group therapies, encourage use of coping skills
Provide education on coping skills that aid in mitigating suicidal thoughts; encourage use of coping skills after education is provided by staff when pt is experiencing urges to engage in self-injurious behavior; redirect as needed/indicated

## 2024-04-25 NOTE — BH TREATMENT PLAN - NSTXDCOPLKINTERSW_PSY_ALL_CORE
Support and psychoed to be provided and all elements of the discharge plan to be arranged when appropriate.
SW will provide pt with support, discuss aftercare plans, make appropriate referrals for mental health and discuss discharge readiness with team.
SW will provide pt with support, discuss aftercare plans, make appropriate referrals for mental health and discuss discharge readiness with team.

## 2024-04-25 NOTE — BH INPATIENT PSYCHIATRY DISCHARGE NOTE - NSDCMRMEDTOKEN_GEN_ALL_CORE_FT
ARIPiprazole 5 mg oral tablet: 1 tab(s) orally once a day  FLUoxetine 40 mg oral capsule: 1 cap(s) orally once a day  hydrOXYzine hydrochloride 50 mg oral tablet: 1 tab(s) orally 3 times a day as needed for Anxiety

## 2024-04-25 NOTE — BH PSYCHOLOGY - GROUP THERAPY NOTE - NSBHPSYCHOLGRPNAME_PSY_A_CORE
DBT Homework
other...
DBT Skills
DBT Homework
DBT Skills
other...
DBT Skills
DBT Homework

## 2024-04-25 NOTE — BH PSYCHOLOGY - GROUP THERAPY NOTE - TOKEN PULL-DIAGNOSIS
Primary Diagnosis:  Major depression [F32.9]        Problem Dx:   

## 2024-04-25 NOTE — BH PSYCHOLOGY - GROUP THERAPY NOTE - NSBHPTASSESSDT_PSY_A_CORE
22-Apr-2024 11:15
24-Apr-2024 09:15
25-Apr-2024 11:00
11-Apr-2024 15:15
12-Apr-2024 11:15
17-Apr-2024 09:15
18-Apr-2024 15:15
23-Apr-2024 11:15
10-Apr-2024 09:15
16-Apr-2024 11:15
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
## 2024-04-25 NOTE — BH PSYCHOLOGY - GROUP THERAPY NOTE - NSPSYCHOLGRPBILLING_PSY_A_CORE
[id number] - Group Psychotherapy
[id number] - Group Psychotherapy
[id number] - Group Psychotherapy
[id number] - Group Psychotherapy
[id number] - Group Psychotherapy
[id number] - Group Psychotherapy
[id number] - Group Psychotherapy
[id number] - Group Psychotherapy
[id number] - Group Psychotherapy
[id number] - Group Psychotherapy

## 2024-04-25 NOTE — BH PSYCHOLOGY - GROUP THERAPY NOTE - NSPSYCHOLGRPDBTGOAL_PSY_A_CORE
reduce mood and affective lability/improve ability to indentify feelings/improve ability to communicate feelings/reduce vulnerability to emotional dysregualation

## 2024-04-25 NOTE — BH INPATIENT PSYCHIATRY DISCHARGE NOTE - DESCRIPTION
Lives with mother, step-father, and sister. 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

## 2024-04-25 NOTE — BH TREATMENT PLAN - NSPTSTATEDGOAL_PSY_ALL_CORE
Patient attempted to overdose on Tylenol.

## 2024-04-25 NOTE — BH INPATIENT PSYCHIATRY DISCHARGE NOTE - NSBHMETABOLIC_PSY_ALL_CORE_FT
BMI: BMI (kg/m2): 18.7 (04-09-24 @ 17:56)  HbA1c:   Glucose:   BP: --Vital Signs Last 24 Hrs  T(C): 36.5 (04-25-24 @ 07:56), Max: 36.8 (04-24-24 @ 20:28)  T(F): 97.7 (04-25-24 @ 07:56), Max: 98.3 (04-24-24 @ 20:28)  HR: --  BP: --  BP(mean): --  RR: --  SpO2: --    Orthostatic VS  04-25-24 @ 07:56  Lying BP: --/-- HR: --  Sitting BP: 131/85 HR: 97  Standing BP: 134/81 HR: 100  Site: --  Mode: --  Orthostatic VS  04-24-24 @ 08:04  Lying BP: --/-- HR: --  Sitting BP: 126/82 HR: 103  Standing BP: 130/86 HR: 100  Site: --  Mode: --    Lipid Panel:

## 2024-04-25 NOTE — BH PSYCHOLOGY - GROUP THERAPY NOTE - NSBHPSYCHOLRESPONSE_PSY_A_CORE
Coping skills acquired/Accepted support
Coping skills acquired/Insight displayed/Accepted support
Coping skills acquired/Insight displayed/Accepted support
Coping skills acquired/Accepted support
Coping skills acquired/Insight displayed/Accepted support

## 2024-04-25 NOTE — BH PSYCHOLOGY - GROUP THERAPY NOTE - NSPSYCHOLGRPDBTINT_PSY_A_CORE FT
taught emotion regulation skill 
taught emotion regulation skill 
taught distress tolerance skill 
taught mindfulness skill 
taught distress tolerance skill

## 2024-04-26 VITALS — RESPIRATION RATE: 18 BRPM | TEMPERATURE: 97 F

## 2024-04-26 PROCEDURE — 99232 SBSQ HOSP IP/OBS MODERATE 35: CPT

## 2024-04-26 RX ADMIN — ARIPIPRAZOLE 5 MILLIGRAM(S): 15 TABLET ORAL at 09:04

## 2024-04-26 RX ADMIN — Medication 40 MILLIGRAM(S): at 09:04

## 2024-04-26 NOTE — BH INPATIENT PSYCHIATRY PROGRESS NOTE - CURRENT MEDICATION
MEDICATIONS  (STANDING):  FLUoxetine 20 milliGRAM(s) Oral daily    MEDICATIONS  (PRN):  haloperidol     Tablet 5 milliGRAM(s) Oral every 6 hours PRN agitation  haloperidol    Injectable 5 milliGRAM(s) IntraMuscular once PRN severe agitation  hydrOXYzine hydrochloride 25 milliGRAM(s) Oral three times a day PRN Anxiety  LORazepam     Tablet 2 milliGRAM(s) Oral every 6 hours PRN agitation/anxiety  LORazepam   Injectable 2 milliGRAM(s) IntraMuscular once PRN severe agitation  melatonin. 3 milliGRAM(s) Oral at bedtime PRN Insomnia  
MEDICATIONS  (STANDING):  ARIPiprazole 5 milliGRAM(s) Oral daily  FLUoxetine 20 milliGRAM(s) Oral daily  melatonin. 5 milliGRAM(s) Oral at bedtime    MEDICATIONS  (PRN):  haloperidol     Tablet 5 milliGRAM(s) Oral every 6 hours PRN agitation  haloperidol    Injectable 5 milliGRAM(s) IntraMuscular once PRN severe agitation  hydrOXYzine hydrochloride 50 milliGRAM(s) Oral three times a day PRN Anxiety  LORazepam     Tablet 2 milliGRAM(s) Oral every 6 hours PRN agitation/anxiety  LORazepam   Injectable 2 milliGRAM(s) IntraMuscular once PRN severe agitation  
MEDICATIONS  (STANDING):  ARIPiprazole 5 milliGRAM(s) Oral daily  FLUoxetine 20 milliGRAM(s) Oral daily    MEDICATIONS  (PRN):  haloperidol     Tablet 5 milliGRAM(s) Oral every 6 hours PRN agitation  haloperidol    Injectable 5 milliGRAM(s) IntraMuscular once PRN severe agitation  hydrOXYzine hydrochloride 50 milliGRAM(s) Oral three times a day PRN Anxiety  LORazepam     Tablet 2 milliGRAM(s) Oral every 6 hours PRN agitation/anxiety  LORazepam   Injectable 2 milliGRAM(s) IntraMuscular once PRN severe agitation  melatonin. 3 milliGRAM(s) Oral at bedtime PRN Insomnia  
MEDICATIONS  (STANDING):  FLUoxetine 20 milliGRAM(s) Oral daily    MEDICATIONS  (PRN):  haloperidol     Tablet 5 milliGRAM(s) Oral every 6 hours PRN agitation  haloperidol    Injectable 5 milliGRAM(s) IntraMuscular once PRN severe agitation  hydrOXYzine hydrochloride 25 milliGRAM(s) Oral three times a day PRN Anxiety  LORazepam     Tablet 2 milliGRAM(s) Oral every 6 hours PRN agitation/anxiety  LORazepam   Injectable 2 milliGRAM(s) IntraMuscular once PRN severe agitation  melatonin. 3 milliGRAM(s) Oral at bedtime PRN Insomnia  
MEDICATIONS  (STANDING):  ARIPiprazole 5 milliGRAM(s) Oral daily  FLUoxetine 40 milliGRAM(s) Oral daily  melatonin. 5 milliGRAM(s) Oral at bedtime    MEDICATIONS  (PRN):  haloperidol     Tablet 5 milliGRAM(s) Oral every 6 hours PRN agitation  haloperidol    Injectable 5 milliGRAM(s) IntraMuscular once PRN severe agitation  hydrOXYzine hydrochloride 50 milliGRAM(s) Oral three times a day PRN Anxiety  LORazepam     Tablet 2 milliGRAM(s) Oral every 6 hours PRN agitation/anxiety  LORazepam   Injectable 2 milliGRAM(s) IntraMuscular once PRN severe agitation  
MEDICATIONS  (STANDING):  ARIPiprazole 5 milliGRAM(s) Oral daily  FLUoxetine 20 milliGRAM(s) Oral daily    MEDICATIONS  (PRN):  haloperidol     Tablet 5 milliGRAM(s) Oral every 6 hours PRN agitation  haloperidol    Injectable 5 milliGRAM(s) IntraMuscular once PRN severe agitation  hydrOXYzine hydrochloride 50 milliGRAM(s) Oral three times a day PRN Anxiety  LORazepam     Tablet 2 milliGRAM(s) Oral every 6 hours PRN agitation/anxiety  LORazepam   Injectable 2 milliGRAM(s) IntraMuscular once PRN severe agitation  melatonin. 3 milliGRAM(s) Oral at bedtime PRN Insomnia  
MEDICATIONS  (STANDING):  ARIPiprazole 5 milliGRAM(s) Oral daily  FLUoxetine 40 milliGRAM(s) Oral daily  melatonin. 5 milliGRAM(s) Oral at bedtime    MEDICATIONS  (PRN):  haloperidol     Tablet 5 milliGRAM(s) Oral every 6 hours PRN agitation  haloperidol    Injectable 5 milliGRAM(s) IntraMuscular once PRN severe agitation  hydrOXYzine hydrochloride 50 milliGRAM(s) Oral three times a day PRN Anxiety  LORazepam     Tablet 2 milliGRAM(s) Oral every 6 hours PRN agitation/anxiety  LORazepam   Injectable 2 milliGRAM(s) IntraMuscular once PRN severe agitation  
MEDICATIONS  (STANDING):  ARIPiprazole 5 milliGRAM(s) Oral daily  FLUoxetine 20 milliGRAM(s) Oral daily    MEDICATIONS  (PRN):  haloperidol     Tablet 5 milliGRAM(s) Oral every 6 hours PRN agitation  haloperidol    Injectable 5 milliGRAM(s) IntraMuscular once PRN severe agitation  hydrOXYzine hydrochloride 50 milliGRAM(s) Oral three times a day PRN Anxiety  LORazepam     Tablet 2 milliGRAM(s) Oral every 6 hours PRN agitation/anxiety  LORazepam   Injectable 2 milliGRAM(s) IntraMuscular once PRN severe agitation  melatonin. 3 milliGRAM(s) Oral at bedtime PRN Insomnia  
MEDICATIONS  (STANDING):  ARIPiprazole 5 milliGRAM(s) Oral daily  melatonin. 5 milliGRAM(s) Oral at bedtime    MEDICATIONS  (PRN):  haloperidol     Tablet 5 milliGRAM(s) Oral every 6 hours PRN agitation  haloperidol    Injectable 5 milliGRAM(s) IntraMuscular once PRN severe agitation  hydrOXYzine hydrochloride 50 milliGRAM(s) Oral three times a day PRN Anxiety  LORazepam     Tablet 2 milliGRAM(s) Oral every 6 hours PRN agitation/anxiety  LORazepam   Injectable 2 milliGRAM(s) IntraMuscular once PRN severe agitation  
MEDICATIONS  (STANDING):  ARIPiprazole 5 milliGRAM(s) Oral daily  FLUoxetine 20 milliGRAM(s) Oral daily    MEDICATIONS  (PRN):  haloperidol     Tablet 5 milliGRAM(s) Oral every 6 hours PRN agitation  haloperidol    Injectable 5 milliGRAM(s) IntraMuscular once PRN severe agitation  hydrOXYzine hydrochloride 50 milliGRAM(s) Oral three times a day PRN Anxiety  LORazepam     Tablet 2 milliGRAM(s) Oral every 6 hours PRN agitation/anxiety  LORazepam   Injectable 2 milliGRAM(s) IntraMuscular once PRN severe agitation  melatonin. 3 milliGRAM(s) Oral at bedtime PRN Insomnia  
MEDICATIONS  (STANDING):    MEDICATIONS  (PRN):  haloperidol     Tablet 5 milliGRAM(s) Oral every 6 hours PRN agitation  haloperidol    Injectable 5 milliGRAM(s) IntraMuscular once PRN severe agitation  hydrOXYzine hydrochloride 25 milliGRAM(s) Oral three times a day PRN Anxiety  LORazepam     Tablet 2 milliGRAM(s) Oral every 6 hours PRN agitation/anxiety  LORazepam   Injectable 2 milliGRAM(s) IntraMuscular once PRN severe agitation  melatonin. 3 milliGRAM(s) Oral at bedtime PRN Insomnia  
MEDICATIONS  (STANDING):    MEDICATIONS  (PRN):  haloperidol     Tablet 5 milliGRAM(s) Oral every 6 hours PRN agitation  haloperidol    Injectable 5 milliGRAM(s) IntraMuscular once PRN severe agitation  LORazepam     Tablet 2 milliGRAM(s) Oral every 6 hours PRN agitation/anxiety  LORazepam   Injectable 2 milliGRAM(s) IntraMuscular once PRN severe agitation  melatonin. 3 milliGRAM(s) Oral at bedtime PRN Insomnia  
MEDICATIONS  (STANDING):  ARIPiprazole 5 milliGRAM(s) Oral daily  FLUoxetine 20 milliGRAM(s) Oral daily    MEDICATIONS  (PRN):  haloperidol     Tablet 5 milliGRAM(s) Oral every 6 hours PRN agitation  haloperidol    Injectable 5 milliGRAM(s) IntraMuscular once PRN severe agitation  hydrOXYzine hydrochloride 50 milliGRAM(s) Oral three times a day PRN Anxiety  LORazepam     Tablet 2 milliGRAM(s) Oral every 6 hours PRN agitation/anxiety  LORazepam   Injectable 2 milliGRAM(s) IntraMuscular once PRN severe agitation  melatonin. 3 milliGRAM(s) Oral at bedtime PRN Insomnia  
MEDICATIONS  (STANDING):  FLUoxetine 10 milliGRAM(s) Oral daily    MEDICATIONS  (PRN):  haloperidol     Tablet 5 milliGRAM(s) Oral every 6 hours PRN agitation  haloperidol    Injectable 5 milliGRAM(s) IntraMuscular once PRN severe agitation  LORazepam     Tablet 2 milliGRAM(s) Oral every 6 hours PRN agitation/anxiety  LORazepam   Injectable 2 milliGRAM(s) IntraMuscular once PRN severe agitation  melatonin. 3 milliGRAM(s) Oral at bedtime PRN Insomnia  
MEDICATIONS  (STANDING):  FLUoxetine 20 milliGRAM(s) Oral daily    MEDICATIONS  (PRN):  haloperidol     Tablet 5 milliGRAM(s) Oral every 6 hours PRN agitation  haloperidol    Injectable 5 milliGRAM(s) IntraMuscular once PRN severe agitation  hydrOXYzine hydrochloride 25 milliGRAM(s) Oral three times a day PRN Anxiety  LORazepam     Tablet 2 milliGRAM(s) Oral every 6 hours PRN agitation/anxiety  LORazepam   Injectable 2 milliGRAM(s) IntraMuscular once PRN severe agitation  melatonin. 3 milliGRAM(s) Oral at bedtime PRN Insomnia

## 2024-04-26 NOTE — BH INPATIENT PSYCHIATRY PROGRESS NOTE - NSBHMSEAFFQUAL_PSY_A_CORE
Depressed
Euthymic
Depressed
Euthymic
Depressed
Euthymic
Depressed
Depressed

## 2024-04-26 NOTE — BH INPATIENT PSYCHIATRY PROGRESS NOTE - PRN MEDS
MEDICATIONS  (PRN):  haloperidol     Tablet 5 milliGRAM(s) Oral every 6 hours PRN agitation  haloperidol    Injectable 5 milliGRAM(s) IntraMuscular once PRN severe agitation  hydrOXYzine hydrochloride 25 milliGRAM(s) Oral three times a day PRN Anxiety  LORazepam     Tablet 2 milliGRAM(s) Oral every 6 hours PRN agitation/anxiety  LORazepam   Injectable 2 milliGRAM(s) IntraMuscular once PRN severe agitation  melatonin. 3 milliGRAM(s) Oral at bedtime PRN Insomnia  
MEDICATIONS  (PRN):  haloperidol     Tablet 5 milliGRAM(s) Oral every 6 hours PRN agitation  haloperidol    Injectable 5 milliGRAM(s) IntraMuscular once PRN severe agitation  hydrOXYzine hydrochloride 25 milliGRAM(s) Oral three times a day PRN Anxiety  LORazepam     Tablet 2 milliGRAM(s) Oral every 6 hours PRN agitation/anxiety  LORazepam   Injectable 2 milliGRAM(s) IntraMuscular once PRN severe agitation  melatonin. 3 milliGRAM(s) Oral at bedtime PRN Insomnia  
MEDICATIONS  (PRN):  haloperidol     Tablet 5 milliGRAM(s) Oral every 6 hours PRN agitation  haloperidol    Injectable 5 milliGRAM(s) IntraMuscular once PRN severe agitation  hydrOXYzine hydrochloride 50 milliGRAM(s) Oral three times a day PRN Anxiety  LORazepam     Tablet 2 milliGRAM(s) Oral every 6 hours PRN agitation/anxiety  LORazepam   Injectable 2 milliGRAM(s) IntraMuscular once PRN severe agitation  
MEDICATIONS  (PRN):  haloperidol     Tablet 5 milliGRAM(s) Oral every 6 hours PRN agitation  haloperidol    Injectable 5 milliGRAM(s) IntraMuscular once PRN severe agitation  hydrOXYzine hydrochloride 50 milliGRAM(s) Oral three times a day PRN Anxiety  LORazepam     Tablet 2 milliGRAM(s) Oral every 6 hours PRN agitation/anxiety  LORazepam   Injectable 2 milliGRAM(s) IntraMuscular once PRN severe agitation  melatonin. 3 milliGRAM(s) Oral at bedtime PRN Insomnia  
MEDICATIONS  (PRN):  haloperidol     Tablet 5 milliGRAM(s) Oral every 6 hours PRN agitation  haloperidol    Injectable 5 milliGRAM(s) IntraMuscular once PRN severe agitation  LORazepam     Tablet 2 milliGRAM(s) Oral every 6 hours PRN agitation/anxiety  LORazepam   Injectable 2 milliGRAM(s) IntraMuscular once PRN severe agitation  melatonin. 3 milliGRAM(s) Oral at bedtime PRN Insomnia  
MEDICATIONS  (PRN):  haloperidol     Tablet 5 milliGRAM(s) Oral every 6 hours PRN agitation  haloperidol    Injectable 5 milliGRAM(s) IntraMuscular once PRN severe agitation  LORazepam     Tablet 2 milliGRAM(s) Oral every 6 hours PRN agitation/anxiety  LORazepam   Injectable 2 milliGRAM(s) IntraMuscular once PRN severe agitation  melatonin. 3 milliGRAM(s) Oral at bedtime PRN Insomnia  
MEDICATIONS  (PRN):  haloperidol     Tablet 5 milliGRAM(s) Oral every 6 hours PRN agitation  haloperidol    Injectable 5 milliGRAM(s) IntraMuscular once PRN severe agitation  hydrOXYzine hydrochloride 50 milliGRAM(s) Oral three times a day PRN Anxiety  LORazepam     Tablet 2 milliGRAM(s) Oral every 6 hours PRN agitation/anxiety  LORazepam   Injectable 2 milliGRAM(s) IntraMuscular once PRN severe agitation  
MEDICATIONS  (PRN):  haloperidol     Tablet 5 milliGRAM(s) Oral every 6 hours PRN agitation  haloperidol    Injectable 5 milliGRAM(s) IntraMuscular once PRN severe agitation  hydrOXYzine hydrochloride 50 milliGRAM(s) Oral three times a day PRN Anxiety  LORazepam     Tablet 2 milliGRAM(s) Oral every 6 hours PRN agitation/anxiety  LORazepam   Injectable 2 milliGRAM(s) IntraMuscular once PRN severe agitation  melatonin. 3 milliGRAM(s) Oral at bedtime PRN Insomnia  
MEDICATIONS  (PRN):  haloperidol     Tablet 5 milliGRAM(s) Oral every 6 hours PRN agitation  haloperidol    Injectable 5 milliGRAM(s) IntraMuscular once PRN severe agitation  hydrOXYzine hydrochloride 25 milliGRAM(s) Oral three times a day PRN Anxiety  LORazepam     Tablet 2 milliGRAM(s) Oral every 6 hours PRN agitation/anxiety  LORazepam   Injectable 2 milliGRAM(s) IntraMuscular once PRN severe agitation  melatonin. 3 milliGRAM(s) Oral at bedtime PRN Insomnia  
MEDICATIONS  (PRN):  haloperidol     Tablet 5 milliGRAM(s) Oral every 6 hours PRN agitation  haloperidol    Injectable 5 milliGRAM(s) IntraMuscular once PRN severe agitation  hydrOXYzine hydrochloride 25 milliGRAM(s) Oral three times a day PRN Anxiety  LORazepam     Tablet 2 milliGRAM(s) Oral every 6 hours PRN agitation/anxiety  LORazepam   Injectable 2 milliGRAM(s) IntraMuscular once PRN severe agitation  melatonin. 3 milliGRAM(s) Oral at bedtime PRN Insomnia  
MEDICATIONS  (PRN):  haloperidol     Tablet 5 milliGRAM(s) Oral every 6 hours PRN agitation  haloperidol    Injectable 5 milliGRAM(s) IntraMuscular once PRN severe agitation  hydrOXYzine hydrochloride 50 milliGRAM(s) Oral three times a day PRN Anxiety  LORazepam     Tablet 2 milliGRAM(s) Oral every 6 hours PRN agitation/anxiety  LORazepam   Injectable 2 milliGRAM(s) IntraMuscular once PRN severe agitation  melatonin. 3 milliGRAM(s) Oral at bedtime PRN Insomnia  

## 2024-04-26 NOTE — BH INPATIENT PSYCHIATRY PROGRESS NOTE - NSBHCHARTREVIEWVS_PSY_A_CORE FT
Vital Signs Last 24 Hrs  T(C): 36.3 (04-17-24 @ 08:23), Max: 36.8 (04-16-24 @ 20:49)  T(F): 97.3 (04-17-24 @ 08:23), Max: 98.2 (04-16-24 @ 20:49)  HR: --  BP: --  BP(mean): --  RR: 12 (04-17-24 @ 08:23) (12 - 12)  SpO2: --    Orthostatic VS  04-17-24 @ 08:23  Lying BP: --/-- HR: --  Sitting BP: 129/88 HR: 84  Standing BP: 126/89 HR: 109  Site: --  Mode: --  Orthostatic VS  04-16-24 @ 07:51  Lying BP: --/-- HR: --  Sitting BP: 123/83 HR: 92  Standing BP: 120/90 HR: 107  Site: --  Mode: --  
Vital Signs Last 24 Hrs  T(C): 36.2 (04-16-24 @ 07:51), Max: 36.7 (04-15-24 @ 20:30)  T(F): 97.1 (04-16-24 @ 07:51), Max: 98 (04-15-24 @ 20:30)  HR: --  BP: --  BP(mean): --  RR: 18 (04-16-24 @ 07:51) (18 - 18)  SpO2: --    Orthostatic VS  04-16-24 @ 07:51  Lying BP: --/-- HR: --  Sitting BP: 123/83 HR: 92  Standing BP: 120/90 HR: 107  Site: --  Mode: --  Orthostatic VS  04-15-24 @ 06:39  Lying BP: --/-- HR: --  Sitting BP: 123/83 HR: 79  Standing BP: 126/66 HR: 93  Site: --  Mode: --  
Vital Signs Last 24 Hrs  T(C): 36.3 (04-14-24 @ 06:47), Max: 36.7 (04-13-24 @ 20:41)  T(F): 97.3 (04-14-24 @ 06:47), Max: 98.1 (04-13-24 @ 20:41)  HR: --  BP: --  BP(mean): --  RR: --  SpO2: --    Orthostatic VS  04-14-24 @ 06:47  Lying BP: --/-- HR: --  Sitting BP: 119/74 HR: 86  Standing BP: 116/69 HR: 88  Site: --  Mode: --  Orthostatic VS  04-13-24 @ 10:47  Lying BP: --/-- HR: --  Sitting BP: 111/72 HR: 87  Standing BP: 111/76 HR: 90  Site: --  Mode: --  
Vital Signs Last 24 Hrs  T(C): 36.8 (04-18-24 @ 08:32), Max: 36.8 (04-17-24 @ 20:58)  T(F): 98.2 (04-18-24 @ 08:32), Max: 98.3 (04-17-24 @ 20:58)  HR: --  BP: --  BP(mean): --  RR: --  SpO2: --    Orthostatic VS  04-18-24 @ 08:32  Lying BP: --/-- HR: --  Sitting BP: 113/84 HR: 93  Standing BP: 129/71 HR: 100  Site: --  Mode: --  Orthostatic VS  04-17-24 @ 08:23  Lying BP: --/-- HR: --  Sitting BP: 129/88 HR: 84  Standing BP: 126/89 HR: 109  Site: --  Mode: --  
Vital Signs Last 24 Hrs  T(C): 36.5 (04-12-24 @ 08:52), Max: 36.5 (04-12-24 @ 08:52)  T(F): 97.7 (04-12-24 @ 08:52), Max: 97.7 (04-12-24 @ 08:52)  HR: --  BP: --  BP(mean): --  RR: --  SpO2: --    Orthostatic VS  04-12-24 @ 08:52  Lying BP: --/-- HR: --  Sitting BP: 115/73 HR: 87  Standing BP: 106/82 HR: 94  Site: --  Mode: --  Orthostatic VS  04-11-24 @ 06:49  Lying BP: --/-- HR: --  Sitting BP: 100/70 HR: 91  Standing BP: 107/68 HR: 98  Site: --  Mode: --  
Vital Signs Last 24 Hrs  T(C): 36.4 (04-11-24 @ 06:49), Max: 36.4 (04-11-24 @ 06:49)  T(F): 97.5 (04-11-24 @ 06:49), Max: 97.5 (04-11-24 @ 06:49)  HR: --  BP: --  BP(mean): --  RR: 19 (04-11-24 @ 06:49) (19 - 19)  SpO2: --    Orthostatic VS  04-11-24 @ 06:49  Lying BP: --/-- HR: --  Sitting BP: 100/70 HR: 91  Standing BP: 107/68 HR: 98  Site: --  Mode: --  Orthostatic VS  04-10-24 @ 08:05  Lying BP: --/-- HR: --  Sitting BP: 124/82 HR: 99  Standing BP: 121/78 HR: --  Site: upper right arm  Mode: electronic  Orthostatic VS  04-09-24 @ 17:56  Lying BP: --/-- HR: --  Sitting BP: 106/74 HR: 89  Standing BP: 117/77 HR: 85  Site: --  Mode: --  
Vital Signs Last 24 Hrs  T(C): 36.3 (04-13-24 @ 10:47), Max: 37 (04-12-24 @ 20:33)  T(F): 97.4 (04-13-24 @ 10:47), Max: 98.6 (04-12-24 @ 20:33)  HR: --  BP: --  BP(mean): --  RR: --  SpO2: --    Orthostatic VS  04-13-24 @ 10:47  Lying BP: --/-- HR: --  Sitting BP: 111/72 HR: 87  Standing BP: 111/76 HR: 90  Site: --  Mode: --  Orthostatic VS  04-12-24 @ 08:52  Lying BP: --/-- HR: --  Sitting BP: 115/73 HR: 87  Standing BP: 106/82 HR: 94  Site: --  Mode: --  
Vital Signs Last 24 Hrs  T(C): 36.6 (04-24-24 @ 08:04), Max: 36.6 (04-23-24 @ 20:20)  T(F): 97.9 (04-24-24 @ 08:04), Max: 97.9 (04-24-24 @ 08:04)  HR: --  BP: --  BP(mean): --  RR: 18 (04-24-24 @ 08:04) (18 - 18)  SpO2: --    Orthostatic VS  04-24-24 @ 08:04  Lying BP: --/-- HR: --  Sitting BP: 126/82 HR: 103  Standing BP: 130/86 HR: 100  Site: --  Mode: --  Orthostatic VS  04-23-24 @ 08:01  Lying BP: --/-- HR: --  Sitting BP: 120/82 HR: 90  Standing BP: 136/80 HR: 100  Site: --  Mode: --  
Vital Signs Last 24 Hrs  T(C): 37 (04-22-24 @ 06:35), Max: 37 (04-21-24 @ 20:15)  T(F): 98.6 (04-22-24 @ 06:35), Max: 98.6 (04-21-24 @ 20:15)  HR: --  BP: --  BP(mean): --  RR: 15 (04-22-24 @ 06:35) (15 - 15)  SpO2: --    Orthostatic VS  04-22-24 @ 06:35  Lying BP: --/-- HR: --  Sitting BP: 134/85 HR: 111  Standing BP: 123/79 HR: 112  Site: --  Mode: --  Orthostatic VS  04-21-24 @ 07:53  Lying BP: --/-- HR: --  Sitting BP: 122/87 HR: 100  Standing BP: 110/73 HR: 115  Site: --  Mode: --  
Vital Signs Last 24 Hrs  T(C): 36.4 (04-10-24 @ 08:05), Max: 36.8 (04-09-24 @ 20:42)  T(F): 97.6 (04-10-24 @ 08:05), Max: 98.2 (04-09-24 @ 20:42)  HR: --  BP: --  BP(mean): --  RR: 18 (04-10-24 @ 08:05) (16 - 18)  SpO2: 99% (04-09-24 @ 17:56) (99% - 99%)    Orthostatic VS  04-10-24 @ 08:05  Lying BP: --/-- HR: --  Sitting BP: 124/82 HR: 99  Standing BP: 121/78 HR: --  Site: upper right arm  Mode: electronic  Orthostatic VS  04-09-24 @ 17:56  Lying BP: --/-- HR: --  Sitting BP: 106/74 HR: 89  Standing BP: 117/77 HR: 85  Site: --  Mode: --  
Vital Signs Last 24 Hrs  T(C): 36.3 (04-19-24 @ 06:19), Max: 36.8 (04-18-24 @ 20:31)  T(F): 97.4 (04-19-24 @ 06:19), Max: 98.3 (04-18-24 @ 20:31)  HR: --  BP: --  BP(mean): --  RR: --  SpO2: --    Orthostatic VS  04-19-24 @ 06:19  Lying BP: --/-- HR: --  Sitting BP: 126/83 HR: 99  Standing BP: 114/80 HR: 105  Site: --  Mode: --  Orthostatic VS  04-18-24 @ 08:32  Lying BP: --/-- HR: --  Sitting BP: 113/84 HR: 93  Standing BP: 129/71 HR: 100  Site: --  Mode: --  
Vital Signs Last 24 Hrs  T(C): 36.4 (04-15-24 @ 06:39), Max: 36.4 (04-14-24 @ 20:42)  T(F): 97.6 (04-15-24 @ 06:39), Max: 97.6 (04-15-24 @ 06:39)  HR: --  BP: --  BP(mean): --  RR: 19 (04-15-24 @ 06:39) (19 - 19)  SpO2: --    Orthostatic VS  04-15-24 @ 06:39  Lying BP: --/-- HR: --  Sitting BP: 123/83 HR: 79  Standing BP: 126/66 HR: 93  Site: --  Mode: --  Orthostatic VS  04-14-24 @ 06:47  Lying BP: --/-- HR: --  Sitting BP: 119/74 HR: 86  Standing BP: 116/69 HR: 88  Site: --  Mode: --  
Vital Signs Last 24 Hrs  T(C): 36.7 (04-23-24 @ 08:01), Max: 36.7 (04-23-24 @ 08:01)  T(F): 98 (04-23-24 @ 08:01), Max: 98 (04-23-24 @ 08:01)  HR: --  BP: --  BP(mean): --  RR: --  SpO2: --    Orthostatic VS  04-23-24 @ 08:01  Lying BP: --/-- HR: --  Sitting BP: 120/82 HR: 90  Standing BP: 136/80 HR: 100  Site: --  Mode: --  Orthostatic VS  04-22-24 @ 06:35  Lying BP: --/-- HR: --  Sitting BP: 134/85 HR: 111  Standing BP: 123/79 HR: 112  Site: --  Mode: --  
Vital Signs Last 24 Hrs  T(C): 36.1 (04-26-24 @ 08:16), Max: 36.7 (04-25-24 @ 20:28)  T(F): 96.9 (04-26-24 @ 08:16), Max: 98 (04-25-24 @ 20:28)  HR: --  BP: --  BP(mean): --  RR: 18 (04-26-24 @ 08:16) (18 - 18)  SpO2: --    Orthostatic VS  04-26-24 @ 08:16  Lying BP: --/-- HR: --  Sitting BP: 130/82 HR: 101  Standing BP: 124/80 HR: 116  Site: --  Mode: --  Orthostatic VS  04-25-24 @ 07:56  Lying BP: --/-- HR: --  Sitting BP: 131/85 HR: 97  Standing BP: 134/81 HR: 100  Site: --  Mode: --  
Vital Signs Last 24 Hrs  T(C): 36.5 (04-25-24 @ 07:56), Max: 36.8 (04-24-24 @ 20:28)  T(F): 97.7 (04-25-24 @ 07:56), Max: 98.3 (04-24-24 @ 20:28)  HR: --  BP: --  BP(mean): --  RR: --  SpO2: --    Orthostatic VS  04-25-24 @ 07:56  Lying BP: --/-- HR: --  Sitting BP: 131/85 HR: 97  Standing BP: 134/81 HR: 100  Site: --  Mode: --  Orthostatic VS  04-24-24 @ 08:04  Lying BP: --/-- HR: --  Sitting BP: 126/82 HR: 103  Standing BP: 130/86 HR: 100  Site: --  Mode: --

## 2024-04-26 NOTE — BH INPATIENT PSYCHIATRY PROGRESS NOTE - NSTXDCOPLKDATETRGT_PSY_ALL_CORE
17-Apr-2024
30-Apr-2024
17-Apr-2024
30-Apr-2024
17-Apr-2024
30-Apr-2024
30-Apr-2024

## 2024-04-26 NOTE — BH INPATIENT PSYCHIATRY PROGRESS NOTE - NSTXDCOPLKDATENEW_PSY_ALL_CORE
17-Apr-2024

## 2024-04-26 NOTE — BH INPATIENT PSYCHIATRY PROGRESS NOTE - NSBHATTESTBILLING_PSY_A_CORE
66582-Kqrclchter OBS or IP - low complexity OR 25-34 mins
[id number]-Rkgfipjsbe OBS or IP - moderate complexity OR 35-49 mins
[id number]-Nlzokwkrla OBS or IP - moderate complexity OR 35-49 mins
[id number]-Wcbnczqtdw OBS or IP - moderate complexity OR 35-49 mins
[id number]-Xrbsdubwpx OBS or IP - moderate complexity OR 35-49 mins
86819-Cpzqscvmsj OBS or IP - high complexity OR 50-79 mins
36339-Purjyoyvir OBS or IP - moderate complexity OR 35-49 mins
63650-Vqbddnyoab OBS or IP - moderate complexity OR 35-49 mins
19426-Xubfefeurl OBS or IP - moderate complexity OR 35-49 mins
[id number]-Sbdoshzcyq OBS or IP - moderate complexity OR 35-49 mins
60639-Baxgcvwtou OBS or IP - moderate complexity OR 35-49 mins
25024-Rsrctoituo OBS or IP - moderate complexity OR 35-49 mins
48330-Iqlsvvxdin OBS or IP - moderate complexity OR 35-49 mins
64890-Mcheveeawp OBS or IP - moderate complexity OR 35-49 mins
97403-Hvlgnqbqri OBS or IP - moderate complexity OR 35-49 mins

## 2024-04-26 NOTE — BH INPATIENT PSYCHIATRY PROGRESS NOTE - NSBHMSEKNOWHOW_PSY_ALL_CORE
Educational attainment

## 2024-04-26 NOTE — BH INPATIENT PSYCHIATRY PROGRESS NOTE - NSBHFUPINTERVALCCFT_PSY_A_CORE
"I'm not having a good day"
"I'm not having a good day"
"I am still having dark thoughts and wish that I would never wake up and be with my grandmother and Uncle"
Suicidal overdose
"I'm not having a good day"
"I'm not having a good day"
Suicidal overdose
"I'm not having a good day"
"I just felt that life wasn't worth living when my grandmother passed"
"I'm not having a good day"
Suicidal overdose

## 2024-04-26 NOTE — BH INPATIENT PSYCHIATRY PROGRESS NOTE - NSTXSUICIDDATEEST_PSY_ALL_CORE
10-Apr-2024
09-Apr-2024
10-Apr-2024

## 2024-04-26 NOTE — BH INPATIENT PSYCHIATRY PROGRESS NOTE - NSDCCRITERIA_PSY_ALL_CORE
improvement in symptoms 

## 2024-04-26 NOTE — BH INPATIENT PSYCHIATRY PROGRESS NOTE - NSBHCONSDANGERSELF_PSY_A_CORE
suicidal behavior

## 2024-04-26 NOTE — BH INPATIENT PSYCHIATRY PROGRESS NOTE - NSBHMSEMOOD_PSY_A_CORE
Normal
Depressed
Normal
Depressed
Normal
Depressed
Normal
Normal
Depressed
Depressed

## 2024-04-26 NOTE — BH INPATIENT PSYCHIATRY PROGRESS NOTE - NSCGISEVERILLNESS_PSY_ALL_CORE
[facility] - Med Surg  Discharge Final Note    Primary Care Provider: Yves Moses MD    Expected Discharge Date: 10/28/2023    Patient discharged to home via family transportation.     Patient's bedside nurse notified of the above.    Discharge Plan A and Plan B have been determined by review of patient's clinical status, future medical and therapeutic needs, and coverage/benefits for post-acute care in coordination with multidisciplinary team members.        Final Discharge Note (most recent)       Final Note - 10/28/23 1028          Final Note    Assessment Type Final Discharge Note     Anticipated Discharge Disposition Home or Self Care     What phone number can be called within the next 1-3 days to see how you are doing after discharge? [phone]     Hospital Resources/Appts/Education Provided Appointments scheduled and added to AVS;Provided patient/caregiver with written discharge plan information        Post-Acute Status    Post-Acute Authorization Other     Other Status No Post-Acute Service Needs     Discharge Delays None known at this time                     Important Message from Medicare             Contact Info       Yves Moses MD   Specialty: Internal Medicine   Relationship: PCP - General    [address]  [facility] [address]   Phone: [phone]       Next Steps: Follow up on 11/8/2023    Instructions: 1020am with Yves Moses            Future Appointments   Date Time Provider Department Center   10/30/2023  1:40 PM LAB [facility] LAB [place]   11/6/2023  8:40 AM [facility] [place]   11/8/2023  9:20 AM Yves Moses MD [facility] [place]   11/29/2023  2:15 PM INJECTION SCHEDULE, [facility] INF[facility]   12/11/2023 12:00 PM LAB [facility] LAB [place]   12/11/2023  1:00 PM NS CT1 LIMIT 500 LBS NS CT SCAN [place]   12/12/2023  2:00 PM Young Wesley MD [facility] HEMONC2 [place]   12/20/2023 11:20 AM Yves Moses MD [facility] [place] 
      scheduled post-discharge follow-up appointment and information added to AVS.     Cece Garvin, MSW, LCSW  Manager - Case Management        
4 = Moderately ill – overt symptoms causing noticeable, but modest, functional impairment or distress; symptom level may warrant medication
5 = Markedly ill - intrusive symptoms that distinctly impair social/occupational function or cause intrusive levels of distress
4 = Moderately ill – overt symptoms causing noticeable, but modest, functional impairment or distress; symptom level may warrant medication
3 = Mildly ill – clearly established symptoms with minimal, if any, distress or difficulty in social and occupational function
4 = Moderately ill – overt symptoms causing noticeable, but modest, functional impairment or distress; symptom level may warrant medication
5 = Markedly ill - intrusive symptoms that distinctly impair social/occupational function or cause intrusive levels of distress
4 = Moderately ill – overt symptoms causing noticeable, but modest, functional impairment or distress; symptom level may warrant medication
3 = Mildly ill – clearly established symptoms with minimal, if any, distress or difficulty in social and occupational function
4 = Moderately ill – overt symptoms causing noticeable, but modest, functional impairment or distress; symptom level may warrant medication
4 = Moderately ill – overt symptoms causing noticeable, but modest, functional impairment or distress; symptom level may warrant medication
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
## 2024-04-26 NOTE — BH INPATIENT PSYCHIATRY PROGRESS NOTE - NSBHMETABOLIC_PSY_ALL_CORE_FT
BMI: BMI (kg/m2): 18.7 (04-09-24 @ 17:56)  HbA1c:   Glucose:   BP: --Vital Signs Last 24 Hrs  T(C): 36.7 (04-23-24 @ 08:01), Max: 36.7 (04-23-24 @ 08:01)  T(F): 98 (04-23-24 @ 08:01), Max: 98 (04-23-24 @ 08:01)  HR: --  BP: --  BP(mean): --  RR: --  SpO2: --    Orthostatic VS  04-23-24 @ 08:01  Lying BP: --/-- HR: --  Sitting BP: 120/82 HR: 90  Standing BP: 136/80 HR: 100  Site: --  Mode: --  Orthostatic VS  04-22-24 @ 06:35  Lying BP: --/-- HR: --  Sitting BP: 134/85 HR: 111  Standing BP: 123/79 HR: 112  Site: --  Mode: --    Lipid Panel: 
BMI: BMI (kg/m2): 18.7 (04-09-24 @ 17:56)  HbA1c:   Glucose:   BP: --Vital Signs Last 24 Hrs  T(C): 36.3 (04-14-24 @ 06:47), Max: 36.7 (04-13-24 @ 20:41)  T(F): 97.3 (04-14-24 @ 06:47), Max: 98.1 (04-13-24 @ 20:41)  HR: --  BP: --  BP(mean): --  RR: --  SpO2: --    Orthostatic VS  04-14-24 @ 06:47  Lying BP: --/-- HR: --  Sitting BP: 119/74 HR: 86  Standing BP: 116/69 HR: 88  Site: --  Mode: --  Orthostatic VS  04-13-24 @ 10:47  Lying BP: --/-- HR: --  Sitting BP: 111/72 HR: 87  Standing BP: 111/76 HR: 90  Site: --  Mode: --    Lipid Panel: 
BMI: BMI (kg/m2): 18.7 (04-09-24 @ 17:56)  HbA1c:   Glucose:   BP: --Vital Signs Last 24 Hrs  T(C): 36.1 (04-26-24 @ 08:16), Max: 36.7 (04-25-24 @ 20:28)  T(F): 96.9 (04-26-24 @ 08:16), Max: 98 (04-25-24 @ 20:28)  HR: --  BP: --  BP(mean): --  RR: 18 (04-26-24 @ 08:16) (18 - 18)  SpO2: --    Orthostatic VS  04-26-24 @ 08:16  Lying BP: --/-- HR: --  Sitting BP: 130/82 HR: 101  Standing BP: 124/80 HR: 116  Site: --  Mode: --  Orthostatic VS  04-25-24 @ 07:56  Lying BP: --/-- HR: --  Sitting BP: 131/85 HR: 97  Standing BP: 134/81 HR: 100  Site: --  Mode: --    Lipid Panel: 
BMI: BMI (kg/m2): 18.7 (04-09-24 @ 17:56)  HbA1c:   Glucose:   BP: --Vital Signs Last 24 Hrs  T(C): 36.3 (04-17-24 @ 08:23), Max: 36.8 (04-16-24 @ 20:49)  T(F): 97.3 (04-17-24 @ 08:23), Max: 98.2 (04-16-24 @ 20:49)  HR: --  BP: --  BP(mean): --  RR: 12 (04-17-24 @ 08:23) (12 - 12)  SpO2: --    Orthostatic VS  04-17-24 @ 08:23  Lying BP: --/-- HR: --  Sitting BP: 129/88 HR: 84  Standing BP: 126/89 HR: 109  Site: --  Mode: --  Orthostatic VS  04-16-24 @ 07:51  Lying BP: --/-- HR: --  Sitting BP: 123/83 HR: 92  Standing BP: 120/90 HR: 107  Site: --  Mode: --    Lipid Panel: 
BMI: BMI (kg/m2): 18.7 (04-09-24 @ 17:56)  HbA1c:   Glucose:   BP: --Vital Signs Last 24 Hrs  T(C): 36.5 (04-25-24 @ 07:56), Max: 36.8 (04-24-24 @ 20:28)  T(F): 97.7 (04-25-24 @ 07:56), Max: 98.3 (04-24-24 @ 20:28)  HR: --  BP: --  BP(mean): --  RR: --  SpO2: --    Orthostatic VS  04-25-24 @ 07:56  Lying BP: --/-- HR: --  Sitting BP: 131/85 HR: 97  Standing BP: 134/81 HR: 100  Site: --  Mode: --  Orthostatic VS  04-24-24 @ 08:04  Lying BP: --/-- HR: --  Sitting BP: 126/82 HR: 103  Standing BP: 130/86 HR: 100  Site: --  Mode: --    Lipid Panel: 
BMI: BMI (kg/m2): 18.7 (04-09-24 @ 17:56)  HbA1c:   Glucose:   BP: --Vital Signs Last 24 Hrs  T(C): 36.4 (04-11-24 @ 06:49), Max: 36.4 (04-11-24 @ 06:49)  T(F): 97.5 (04-11-24 @ 06:49), Max: 97.5 (04-11-24 @ 06:49)  HR: --  BP: --  BP(mean): --  RR: 19 (04-11-24 @ 06:49) (19 - 19)  SpO2: --    Orthostatic VS  04-11-24 @ 06:49  Lying BP: --/-- HR: --  Sitting BP: 100/70 HR: 91  Standing BP: 107/68 HR: 98  Site: --  Mode: --  Orthostatic VS  04-10-24 @ 08:05  Lying BP: --/-- HR: --  Sitting BP: 124/82 HR: 99  Standing BP: 121/78 HR: --  Site: upper right arm  Mode: electronic  Orthostatic VS  04-09-24 @ 17:56  Lying BP: --/-- HR: --  Sitting BP: 106/74 HR: 89  Standing BP: 117/77 HR: 85  Site: --  Mode: --    Lipid Panel: 
BMI: BMI (kg/m2): 18.7 (04-09-24 @ 17:56)  HbA1c:   Glucose:   BP: --Vital Signs Last 24 Hrs  T(C): 36.8 (04-18-24 @ 08:32), Max: 36.8 (04-17-24 @ 20:58)  T(F): 98.2 (04-18-24 @ 08:32), Max: 98.3 (04-17-24 @ 20:58)  HR: --  BP: --  BP(mean): --  RR: --  SpO2: --    Orthostatic VS  04-18-24 @ 08:32  Lying BP: --/-- HR: --  Sitting BP: 113/84 HR: 93  Standing BP: 129/71 HR: 100  Site: --  Mode: --  Orthostatic VS  04-17-24 @ 08:23  Lying BP: --/-- HR: --  Sitting BP: 129/88 HR: 84  Standing BP: 126/89 HR: 109  Site: --  Mode: --    Lipid Panel: 
BMI: BMI (kg/m2): 18.7 (04-09-24 @ 17:56)  HbA1c:   Glucose:   BP: --Vital Signs Last 24 Hrs  T(C): 36.3 (04-13-24 @ 10:47), Max: 37 (04-12-24 @ 20:33)  T(F): 97.4 (04-13-24 @ 10:47), Max: 98.6 (04-12-24 @ 20:33)  HR: --  BP: --  BP(mean): --  RR: --  SpO2: --    Orthostatic VS  04-13-24 @ 10:47  Lying BP: --/-- HR: --  Sitting BP: 111/72 HR: 87  Standing BP: 111/76 HR: 90  Site: --  Mode: --  Orthostatic VS  04-12-24 @ 08:52  Lying BP: --/-- HR: --  Sitting BP: 115/73 HR: 87  Standing BP: 106/82 HR: 94  Site: --  Mode: --    Lipid Panel: 
BMI: BMI (kg/m2): 18.7 (04-09-24 @ 17:56)  HbA1c:   Glucose:   BP: --Vital Signs Last 24 Hrs  T(C): 36.4 (04-10-24 @ 08:05), Max: 36.8 (04-09-24 @ 20:42)  T(F): 97.6 (04-10-24 @ 08:05), Max: 98.2 (04-09-24 @ 20:42)  HR: --  BP: --  BP(mean): --  RR: 18 (04-10-24 @ 08:05) (16 - 18)  SpO2: 99% (04-09-24 @ 17:56) (99% - 99%)    Orthostatic VS  04-10-24 @ 08:05  Lying BP: --/-- HR: --  Sitting BP: 124/82 HR: 99  Standing BP: 121/78 HR: --  Site: upper right arm  Mode: electronic  Orthostatic VS  04-09-24 @ 17:56  Lying BP: --/-- HR: --  Sitting BP: 106/74 HR: 89  Standing BP: 117/77 HR: 85  Site: --  Mode: --    Lipid Panel: 
BMI: BMI (kg/m2): 18.7 (04-09-24 @ 17:56)  HbA1c:   Glucose:   BP: --Vital Signs Last 24 Hrs  T(C): 36.4 (04-15-24 @ 06:39), Max: 36.4 (04-14-24 @ 20:42)  T(F): 97.6 (04-15-24 @ 06:39), Max: 97.6 (04-15-24 @ 06:39)  HR: --  BP: --  BP(mean): --  RR: 19 (04-15-24 @ 06:39) (19 - 19)  SpO2: --    Orthostatic VS  04-15-24 @ 06:39  Lying BP: --/-- HR: --  Sitting BP: 123/83 HR: 79  Standing BP: 126/66 HR: 93  Site: --  Mode: --  Orthostatic VS  04-14-24 @ 06:47  Lying BP: --/-- HR: --  Sitting BP: 119/74 HR: 86  Standing BP: 116/69 HR: 88  Site: --  Mode: --    Lipid Panel: 
BMI: BMI (kg/m2): 18.7 (04-09-24 @ 17:56)  HbA1c:   Glucose:   BP: --Vital Signs Last 24 Hrs  T(C): 36.6 (04-24-24 @ 08:04), Max: 36.6 (04-23-24 @ 20:20)  T(F): 97.9 (04-24-24 @ 08:04), Max: 97.9 (04-24-24 @ 08:04)  HR: --  BP: --  BP(mean): --  RR: 18 (04-24-24 @ 08:04) (18 - 18)  SpO2: --    Orthostatic VS  04-24-24 @ 08:04  Lying BP: --/-- HR: --  Sitting BP: 126/82 HR: 103  Standing BP: 130/86 HR: 100  Site: --  Mode: --  Orthostatic VS  04-23-24 @ 08:01  Lying BP: --/-- HR: --  Sitting BP: 120/82 HR: 90  Standing BP: 136/80 HR: 100  Site: --  Mode: --    Lipid Panel: 
BMI: BMI (kg/m2): 18.7 (04-09-24 @ 17:56)  HbA1c:   Glucose:   BP: --Vital Signs Last 24 Hrs  T(C): 36.2 (04-16-24 @ 07:51), Max: 36.7 (04-15-24 @ 20:30)  T(F): 97.1 (04-16-24 @ 07:51), Max: 98 (04-15-24 @ 20:30)  HR: --  BP: --  BP(mean): --  RR: 18 (04-16-24 @ 07:51) (18 - 18)  SpO2: --    Orthostatic VS  04-16-24 @ 07:51  Lying BP: --/-- HR: --  Sitting BP: 123/83 HR: 92  Standing BP: 120/90 HR: 107  Site: --  Mode: --  Orthostatic VS  04-15-24 @ 06:39  Lying BP: --/-- HR: --  Sitting BP: 123/83 HR: 79  Standing BP: 126/66 HR: 93  Site: --  Mode: --    Lipid Panel: 
BMI: BMI (kg/m2): 18.7 (04-09-24 @ 17:56)  HbA1c:   Glucose:   BP: --Vital Signs Last 24 Hrs  T(C): 37 (04-22-24 @ 06:35), Max: 37 (04-21-24 @ 20:15)  T(F): 98.6 (04-22-24 @ 06:35), Max: 98.6 (04-21-24 @ 20:15)  HR: --  BP: --  BP(mean): --  RR: 15 (04-22-24 @ 06:35) (15 - 15)  SpO2: --    Orthostatic VS  04-22-24 @ 06:35  Lying BP: --/-- HR: --  Sitting BP: 134/85 HR: 111  Standing BP: 123/79 HR: 112  Site: --  Mode: --  Orthostatic VS  04-21-24 @ 07:53  Lying BP: --/-- HR: --  Sitting BP: 122/87 HR: 100  Standing BP: 110/73 HR: 115  Site: --  Mode: --    Lipid Panel: 
BMI: BMI (kg/m2): 18.7 (04-09-24 @ 17:56)  HbA1c:   Glucose:   BP: --Vital Signs Last 24 Hrs  T(C): 36.3 (04-19-24 @ 06:19), Max: 36.8 (04-18-24 @ 20:31)  T(F): 97.4 (04-19-24 @ 06:19), Max: 98.3 (04-18-24 @ 20:31)  HR: --  BP: --  BP(mean): --  RR: --  SpO2: --    Orthostatic VS  04-19-24 @ 06:19  Lying BP: --/-- HR: --  Sitting BP: 126/83 HR: 99  Standing BP: 114/80 HR: 105  Site: --  Mode: --  Orthostatic VS  04-18-24 @ 08:32  Lying BP: --/-- HR: --  Sitting BP: 113/84 HR: 93  Standing BP: 129/71 HR: 100  Site: --  Mode: --    Lipid Panel: 
BMI: BMI (kg/m2): 18.7 (04-09-24 @ 17:56)  HbA1c:   Glucose:   BP: --Vital Signs Last 24 Hrs  T(C): 36.5 (04-12-24 @ 08:52), Max: 36.5 (04-12-24 @ 08:52)  T(F): 97.7 (04-12-24 @ 08:52), Max: 97.7 (04-12-24 @ 08:52)  HR: --  BP: --  BP(mean): --  RR: --  SpO2: --    Orthostatic VS  04-12-24 @ 08:52  Lying BP: --/-- HR: --  Sitting BP: 115/73 HR: 87  Standing BP: 106/82 HR: 94  Site: --  Mode: --  Orthostatic VS  04-11-24 @ 06:49  Lying BP: --/-- HR: --  Sitting BP: 100/70 HR: 91  Standing BP: 107/68 HR: 98  Site: --  Mode: --    Lipid Panel:

## 2024-04-26 NOTE — BH INPATIENT PSYCHIATRY PROGRESS NOTE - NSTXSUICIDINTERMD_PSY_ALL_CORE
Psychopharmacology  Psychoeducation

## 2024-04-26 NOTE — BH INPATIENT PSYCHIATRY PROGRESS NOTE - MSE OPTIONS
Structured MSE
Unstructured MSE
Unstructured MSE
Structured MSE

## 2024-04-26 NOTE — BH INPATIENT PSYCHIATRY PROGRESS NOTE - NSTXSUICIDGOAL_PSY_ALL_CORE
Will identify and utilize 2 coping skills
Will develop a suicide prevention/safety plan
Will identify and utilize 2 coping skills
Be able to state 3 reasons for living
Will identify and utilize 2 coping skills
Will identify and utilize 2 coping skills
Will develop a suicide prevention/safety plan
Will identify and utilize 2 coping skills

## 2024-04-26 NOTE — BH INPATIENT PSYCHIATRY PROGRESS NOTE - NSTXSUICIDDATETRGT_PSY_ALL_CORE
18-Apr-2024
16-Apr-2024
24-Apr-2024
18-Apr-2024
26-Apr-2024
24-Apr-2024
18-Apr-2024
24-Apr-2024
01-May-2024
18-Apr-2024
24-Apr-2024
01-May-2024
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
## 2024-04-26 NOTE — BH INPATIENT PSYCHIATRY PROGRESS NOTE - NSTXSUICIDPROGRES_PSY_ALL_CORE
Improving
Met - goal discontinued
Improving
No Change
Improving
Improving
No Change
Improving
Improving
No Change

## 2024-04-26 NOTE — BH INPATIENT PSYCHIATRY PROGRESS NOTE - NSBHATTESTTYPEVISIT_PSY_A_CORE
Attending Only

## 2024-04-26 NOTE — BH INPATIENT PSYCHIATRY PROGRESS NOTE - NSTXDCOPLKDATEEST_PSY_ALL_CORE
23-Apr-2024
23-Apr-2024
10-Apr-2024
23-Apr-2024
10-Apr-2024
23-Apr-2024

## 2024-04-26 NOTE — BH INPATIENT PSYCHIATRY PROGRESS NOTE - NSTXDCOPLKPROGRES_PSY_ALL_CORE
No Change
No Change
Improving
No Change
Improving
No Change
Improving
No Change
No Change
Improving

## 2024-04-26 NOTE — BH INPATIENT PSYCHIATRY PROGRESS NOTE - NSICDXBHPRIMARYDX_PSY_ALL_CORE
Major depression   F32.9  

## 2024-04-26 NOTE — BH INPATIENT PSYCHIATRY PROGRESS NOTE - NSCGIIMPROVESX_PSY_ALL_CORE
4 = No change - symptoms remain essentially unchanged
3 = Minimally improved - slightly better with little or no clinically meaningful reduction of symptoms.  Represents very little change in basic clinical status, level of care, or functional capacity.
4 = No change - symptoms remain essentially unchanged
3 = Minimally improved - slightly better with little or no clinically meaningful reduction of symptoms.  Represents very little change in basic clinical status, level of care, or functional capacity.

## 2024-04-26 NOTE — BH INPATIENT PSYCHIATRY PROGRESS NOTE - NSBHMSETHTCONTENT_PSY_A_CORE
Suicidality/Other
Suicidality/Other
Hopelessness/Suicidality
Unremarkable
Suicidality
Suicidality/Other
Unremarkable
Unremarkable
Hopelessness/Suicidality
Unremarkable
Suicidality/Other

## 2024-10-22 ENCOUNTER — INPATIENT (INPATIENT)
Facility: HOSPITAL | Age: 19
LOS: 16 days | Discharge: HOME | End: 2024-11-08
Attending: STUDENT IN AN ORGANIZED HEALTH CARE EDUCATION/TRAINING PROGRAM | Admitting: STUDENT IN AN ORGANIZED HEALTH CARE EDUCATION/TRAINING PROGRAM
Payer: COMMERCIAL

## 2024-10-22 VITALS
OXYGEN SATURATION: 95 % | SYSTOLIC BLOOD PRESSURE: 120 MMHG | TEMPERATURE: 98 F | HEIGHT: 70 IN | DIASTOLIC BLOOD PRESSURE: 79 MMHG | RESPIRATION RATE: 18 BRPM | WEIGHT: 130.07 LBS | HEART RATE: 71 BPM

## 2024-10-22 DIAGNOSIS — F60.3 BORDERLINE PERSONALITY DISORDER: ICD-10-CM

## 2024-10-22 LAB
ALBUMIN SERPL ELPH-MCNC: 4.3 G/DL — SIGNIFICANT CHANGE UP (ref 3.3–5)
ALP SERPL-CCNC: 59 U/L — SIGNIFICANT CHANGE UP (ref 40–120)
ALT FLD-CCNC: 10 U/L — SIGNIFICANT CHANGE UP (ref 4–33)
AMPHET UR-MCNC: NEGATIVE — SIGNIFICANT CHANGE UP
ANION GAP SERPL CALC-SCNC: 12 MMOL/L — SIGNIFICANT CHANGE UP (ref 7–14)
APAP SERPL-MCNC: <10 UG/ML — LOW (ref 15–25)
APPEARANCE UR: ABNORMAL
AST SERPL-CCNC: 16 U/L — SIGNIFICANT CHANGE UP (ref 4–32)
BACTERIA # UR AUTO: ABNORMAL /HPF
BARBITURATES UR SCN-MCNC: NEGATIVE — SIGNIFICANT CHANGE UP
BASOPHILS # BLD AUTO: 0.06 K/UL — SIGNIFICANT CHANGE UP (ref 0–0.2)
BASOPHILS NFR BLD AUTO: 0.8 % — SIGNIFICANT CHANGE UP (ref 0–2)
BENZODIAZ UR-MCNC: NEGATIVE — SIGNIFICANT CHANGE UP
BILIRUB SERPL-MCNC: 0.4 MG/DL — SIGNIFICANT CHANGE UP (ref 0.2–1.2)
BILIRUB UR-MCNC: NEGATIVE — SIGNIFICANT CHANGE UP
BUN SERPL-MCNC: 17 MG/DL — SIGNIFICANT CHANGE UP (ref 7–23)
CALCIUM SERPL-MCNC: 9.3 MG/DL — SIGNIFICANT CHANGE UP (ref 8.4–10.5)
CAST: 0 /LPF — SIGNIFICANT CHANGE UP (ref 0–4)
CHLORIDE SERPL-SCNC: 103 MMOL/L — SIGNIFICANT CHANGE UP (ref 98–107)
CO2 SERPL-SCNC: 22 MMOL/L — SIGNIFICANT CHANGE UP (ref 22–31)
COCAINE METAB.OTHER UR-MCNC: NEGATIVE — SIGNIFICANT CHANGE UP
COLOR SPEC: YELLOW — SIGNIFICANT CHANGE UP
CREAT SERPL-MCNC: 0.85 MG/DL — SIGNIFICANT CHANGE UP (ref 0.5–1.3)
CREATININE URINE RESULT, DAU: 162 MG/DL — SIGNIFICANT CHANGE UP
DIFF PNL FLD: NEGATIVE — SIGNIFICANT CHANGE UP
EGFR: 100 ML/MIN/1.73M2 — SIGNIFICANT CHANGE UP
EOSINOPHIL # BLD AUTO: 0.07 K/UL — SIGNIFICANT CHANGE UP (ref 0–0.5)
EOSINOPHIL NFR BLD AUTO: 1 % — SIGNIFICANT CHANGE UP (ref 0–6)
ETHANOL SERPL-MCNC: <10 MG/DL — SIGNIFICANT CHANGE UP
FENTANYL UR QL SCN: NEGATIVE — SIGNIFICANT CHANGE UP
GLUCOSE SERPL-MCNC: 85 MG/DL — SIGNIFICANT CHANGE UP (ref 70–99)
GLUCOSE UR QL: NEGATIVE MG/DL — SIGNIFICANT CHANGE UP
HCG SERPL-ACNC: <1 MIU/ML — SIGNIFICANT CHANGE UP
HCT VFR BLD CALC: 39.7 % — SIGNIFICANT CHANGE UP (ref 34.5–45)
HGB BLD-MCNC: 13.3 G/DL — SIGNIFICANT CHANGE UP (ref 11.5–15.5)
IANC: 4.87 K/UL — SIGNIFICANT CHANGE UP (ref 1.8–7.4)
IMM GRANULOCYTES NFR BLD AUTO: 0.3 % — SIGNIFICANT CHANGE UP (ref 0–0.9)
KETONES UR-MCNC: NEGATIVE MG/DL — SIGNIFICANT CHANGE UP
LEUKOCYTE ESTERASE UR-ACNC: ABNORMAL
LYMPHOCYTES # BLD AUTO: 1.71 K/UL — SIGNIFICANT CHANGE UP (ref 1–3.3)
LYMPHOCYTES # BLD AUTO: 23.7 % — SIGNIFICANT CHANGE UP (ref 13–44)
MCHC RBC-ENTMCNC: 31.5 PG — SIGNIFICANT CHANGE UP (ref 27–34)
MCHC RBC-ENTMCNC: 33.5 GM/DL — SIGNIFICANT CHANGE UP (ref 32–36)
MCV RBC AUTO: 94.1 FL — SIGNIFICANT CHANGE UP (ref 80–100)
METHADONE UR-MCNC: NEGATIVE — SIGNIFICANT CHANGE UP
MONOCYTES # BLD AUTO: 0.49 K/UL — SIGNIFICANT CHANGE UP (ref 0–0.9)
MONOCYTES NFR BLD AUTO: 6.8 % — SIGNIFICANT CHANGE UP (ref 2–14)
NEUTROPHILS # BLD AUTO: 4.87 K/UL — SIGNIFICANT CHANGE UP (ref 1.8–7.4)
NEUTROPHILS NFR BLD AUTO: 67.4 % — SIGNIFICANT CHANGE UP (ref 43–77)
NITRITE UR-MCNC: NEGATIVE — SIGNIFICANT CHANGE UP
NRBC # BLD: 0 /100 WBCS — SIGNIFICANT CHANGE UP (ref 0–0)
NRBC # FLD: 0 K/UL — SIGNIFICANT CHANGE UP (ref 0–0)
OPIATES UR-MCNC: NEGATIVE — SIGNIFICANT CHANGE UP
OXYCODONE UR-MCNC: NEGATIVE — SIGNIFICANT CHANGE UP
PCP SPEC-MCNC: SIGNIFICANT CHANGE UP
PCP UR-MCNC: NEGATIVE — SIGNIFICANT CHANGE UP
PH UR: 7 — SIGNIFICANT CHANGE UP (ref 5–8)
PLATELET # BLD AUTO: 312 K/UL — SIGNIFICANT CHANGE UP (ref 150–400)
POTASSIUM SERPL-MCNC: 4 MMOL/L — SIGNIFICANT CHANGE UP (ref 3.5–5.3)
POTASSIUM SERPL-SCNC: 4 MMOL/L — SIGNIFICANT CHANGE UP (ref 3.5–5.3)
PROT SERPL-MCNC: 7.7 G/DL — SIGNIFICANT CHANGE UP (ref 6–8.3)
PROT UR-MCNC: NEGATIVE MG/DL — SIGNIFICANT CHANGE UP
RBC # BLD: 4.22 M/UL — SIGNIFICANT CHANGE UP (ref 3.8–5.2)
RBC # FLD: 11.6 % — SIGNIFICANT CHANGE UP (ref 10.3–14.5)
RBC CASTS # UR COMP ASSIST: 1 /HPF — SIGNIFICANT CHANGE UP (ref 0–4)
SALICYLATES SERPL-MCNC: <0.3 MG/DL — LOW (ref 15–30)
SARS-COV-2 RNA SPEC QL NAA+PROBE: SIGNIFICANT CHANGE UP
SODIUM SERPL-SCNC: 137 MMOL/L — SIGNIFICANT CHANGE UP (ref 135–145)
SP GR SPEC: 1.02 — SIGNIFICANT CHANGE UP (ref 1–1.03)
SQUAMOUS # UR AUTO: 11 /HPF — HIGH (ref 0–5)
THC UR QL: POSITIVE
TOXICOLOGY SCREEN, DRUGS OF ABUSE, SERUM RESULT: SIGNIFICANT CHANGE UP
TSH SERPL-MCNC: 1.07 UIU/ML — SIGNIFICANT CHANGE UP (ref 0.27–4.2)
UROBILINOGEN FLD QL: 1 MG/DL — SIGNIFICANT CHANGE UP (ref 0.2–1)
WBC # BLD: 7.22 K/UL — SIGNIFICANT CHANGE UP (ref 3.8–10.5)
WBC # FLD AUTO: 7.22 K/UL — SIGNIFICANT CHANGE UP (ref 3.8–10.5)
WBC UR QL: 2 /HPF — SIGNIFICANT CHANGE UP (ref 0–5)

## 2024-10-22 PROCEDURE — 99285 EMERGENCY DEPT VISIT HI MDM: CPT

## 2024-10-22 RX ORDER — LORAZEPAM 2 MG
2 TABLET ORAL ONCE
Refills: 0 | Status: DISCONTINUED | OUTPATIENT
Start: 2024-10-22 | End: 2024-10-29

## 2024-10-22 RX ORDER — ARIPIPRAZOLE 2 MG/1
10 TABLET ORAL DAILY
Refills: 0 | Status: DISCONTINUED | OUTPATIENT
Start: 2024-10-22 | End: 2024-10-24

## 2024-10-22 RX ORDER — LORAZEPAM 2 MG
2 TABLET ORAL EVERY 6 HOURS
Refills: 0 | Status: DISCONTINUED | OUTPATIENT
Start: 2024-10-22 | End: 2024-10-28

## 2024-10-22 RX ORDER — HYDROXYZINE HCL 25 MG
50 TABLET ORAL EVERY 6 HOURS
Refills: 0 | Status: DISCONTINUED | OUTPATIENT
Start: 2024-10-22 | End: 2024-11-08

## 2024-10-22 RX ORDER — FLUOXETINE HCL 10 MG
60 CAPSULE ORAL DAILY
Refills: 0 | Status: DISCONTINUED | OUTPATIENT
Start: 2024-10-22 | End: 2024-11-08

## 2024-10-22 RX ADMIN — Medication 2 MILLIGRAM(S): at 22:12

## 2024-10-22 NOTE — ED ADULT NURSE NOTE - OBJECTIVE STATEMENT
Pt arrives via walk-in, reports a hx of depression and SI with self cutting recently 1 month ago, Pt  requesting voluntary admission on arrival. P reports suciicdal plans to crash the car  or overdose on meds.  Pt denies hi,ah,vh,etoh, drug use.
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
## 2024-10-22 NOTE — BH PATIENT PROFILE - NSVRISKPERSONALITY_PSY_ALL_CORE
This is a Subsequent Medicare Annual Wellness Visit providing Personalized Prevention Plan Services (PPPS) (Performed 12 months after initial AWV and PPPS )    I have reviewed the patient's medical history in detail and updated the computerized patient record.      History     Past Medical History:   Diagnosis Date    ACP (advance care planning) 2/9/2016    Patient plans to prepare an advanced directive and bring it in   [address] 5/31/2016    Cataracts, bilateral 8/1/2011    Depression, major, single episode, moderate ([facility] [address] 8/1/2011    Sees Dr Leila Cornejo    Diabetic nephropathy ([facility] [address] 8/1/2011    Essential hypertension     Financial difficulties 7/30/2012    Hyperlipidemia     Obesity     SKYLER (obstructive sleep apnea) 2/9/2016    Pregnancy 8/1/2011    Stroke ([facility] [address]     2004 ARABELLA    TIA (transient ischemic attack) 8/1/2011      Past Surgical History:   Procedure Laterality Date    HX GYN      HX HYSTERECTOMY      over 25 years    HX MYOMECTOMY      oiver 30 years     Current Outpatient Prescriptions   Medication Sig Dispense Refill    HYDROcodone-acetaminophen (NORCO) 5-325 mg per tablet       JANUMET -1,000 mg TM24 TAKE ONE TABLET BY MOUTH DAILY (BEFORE DINNER) 30 Tab 5    OXcarbazepine (TRILEPTAL) 300 mg tablet TAKE ONE AND ONE-HALF TABLET BY MOUTH TWICE A DAY 90 Tab 11    metoprolol succinate (TOPROL-XL) 200 mg XL tablet TAKE ONE TABLET BY MOUTH DAILY 90 Tab 0    ONE TOUCH DELICA 33 gauge misc TEST DAILY OR AS DIRECTED BY PHYSICIAN 100 Lancet prn    ONETOUCH ULTRA TEST strip TEST DAILY OR AS DIRECTED 100 Strip PRN    ONE TOUCH DELICA 33 gauge misc Use as directed      hydrochlorothiazide (HYDRODIURIL) 25 mg tablet TAKE ONE TABLET BY MOUTH DAILY **GENERIC FOR: HYDRODIURIL 90 Tab 3    aspirin-dipyridamole (AGGRENOX)  mg per SR capsule TAKE ONE CAPSULE BY MOUTH TWICE A DAY 60 Cap 11    rosuvastatin (CRESTOR) 40 mg tablet TAKE ONE TABLET BY MOUTH NIGHTLY 30 Tab 3    glipiZIDE (GLUCOTROL) 10 mg tablet TAKE ONE TABLET BY MOUTH TWICE A DAY 30 Tab 3    amLODIPine-valsartan (EXFORGE)  mg per tablet Take 1 Tab by mouth daily. 90 Tab 3    Blood Pressure Monitor (BLOOD PRESSURE KIT) kit Use 2-3 times a week or as directed 1 Kit 0    fluticasone (FLONASE) 50 mcg/actuation nasal spray 2 Sprays by Both Nostrils route daily. 1 Bottle 11    guaiFENesin (MUCINEX) 1,200 mg Ta12 ER tablet Take  by mouth two (2) times a day.  Blood-Glucose Meter (ONETOUCH ULTRAMINI) monitoring kit Test daily or as directed. 1 Kit 0    butalbital-acetaminophen-caffeine (FIORICET, ESGIC) -40 mg per tablet Take 1 Tab by mouth every four (4) hours as needed for Pain.  30 Tab 2    cyclobenzaprine (FLEXERIL) 10 mg tablet TAKE ONE TABLET BY MOUTH THREE TIMES DAILY AS NEEDED FOR MUSCLE SPASM 90 Tab 0    BD ULTRA-FINE II LANCETS 30 gauge misc TEST DAILY OR AS DIRECTED 200 Package prn     Allergies   Allergen Reactions    Hydroxyzine Other (comments)     Caused memory loss    Levaquin [Levofloxacin] Diarrhea    Lipitor [Atorvastatin] Myalgia    Lisinopril Cough    Penicillins Rash    Sulfa (Sulfonamide Antibiotics) Rash    Zoloft [Sertraline] Diarrhea     Family History   Problem Relation Age of Onset    Hypertension Mother     Seizures Mother     Hypertension Son     Diabetes Son     Elevated Lipids Son     Ataxia Daughter     Seizures Brother     Cancer Maternal Aunt      Social History   Substance Use Topics    Smoking status: Never Smoker    Smokeless tobacco: Never Used    Alcohol use 0.6 oz/week     1 Standard drinks or equivalent per week      Comment: occasionally     Patient Active Problem List   Diagnosis Code    Elevated cholesterol E78.00    Cataracts, bilateral H26.9    TIA (transient ischemic attack) G45.9    Depression, major, single episode, moderate (HCC) F32.1    Diabetic nephropathy (HCC) E11.21    Urinary incontinence R32    Essential hypertension I10    Financial difficulties Z59.8    Snoring R06.83    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 No    Do you average more than 7 drinks per week? No      Functional Ability and Level of Safety:     Hearing Loss   none    Activities of Daily Living   Self-care. Requires assistance with: no ADLs    Fall Risk     Fall Risk Assessment, last 12 mths 4/6/2017   Able to walk? Yes   Fall in past 12 months?  No     Abuse Screen   Patient is not abused    Review of Systems   No complaints    Physical Examination     Evaluation of Cognitive Function:  Mood/affect:  happy  Appearance: age appropriate  Family member/caregiver input: none    Visit Vitals    /63 (BP 1 Location: Left arm, BP Patient Position: Sitting)    Pulse 67    Temp 98.6 °F (37 °C) (Oral)    Resp 18    Ht 5' 4\" (1.626 m)    Wt 236 lb 9.6 oz (107.3 kg)    BMI 40.61 kg/m2     General appearance: alert, cooperative, no distress  Lungs: clear to auscultation bilaterally  Heart: regular rate and rhythm, S1, S2 normal, no murmur, click, rub or gallop  Extremities: no edema    Patient Care Team:  Isla Spurling, MD as PCP - General (Family Practice)  Kenan Giles MD (Orthopedic Surgery)  Melissa Barajas LPN as Nurse Ele Houston MD (Cardiology)  Patric Stuart MD (Nephrology)  Cori Flores MD (Neurology)  Franklin Cuello MD as Physician (Sleep Medicine)  Bethany Sanders MD (Gastroenterology)    Advice/Referrals/Counseling   Education and counseling provided:  Are appropriate based on today's review and evaluation  End-of-Life planning (with patient's consent)  She plans to complete an advanced directive and bring it in  [address] was discussed but the patient did not wish or was not able to name a surrogate decision maker or provide an [address]     Assessment/Plan       ICD-10-CM ICD-9-CM    1. Routine general medical examination at a health care facility Z00.00 V70.0    2. Type 2 diabetes with nephropathy (HCC) E11.21 250.40 MICROALBUMIN, UR, RAND W/ MICROALBUMIN/CREA RATIO     583.81 HEMOGLOBIN A1C WITH EAG   3. Need for zoster vaccination Z23 V04.89 varicella zoster vacine live (ZOSTAVAX) 19,400 unit/0.65 mL susr injection   4. Screening for alcoholism Z13.89 V79.1    5. ACP (advance care planning) Z71.89 V65.49         Labs per orders. Zostavax at pharmacy    Medicare Part B Preventive Services Limitations Recommendation Scheduled   Bone Mass Measurement  (age 72 & older, biennial) Requires diagnosis related to osteoporosis or estrogen deficiency.  Biennial benefit unless patient has history of long-term glucocorticoid tx or baseline is needed because initial test was by other method Every 2 years     Due: 3/21/2018   Cardiovascular Screening Blood Tests (every 5 years)  Total cholesterol, HDL, Triglycerides Order as a panel if possible Every 6-12 months     Due: 11/9/2017   Colorectal Cancer Screening  -Fecal occult blood test (annual)  -Flexible sigmoidoscopy (5y)  -Screening colonoscopy (10y)  -Barium Enema                Q10 years  Last: 6/6/2016               Due: 6/6/2026   Counseling to Prevent Tobacco Use (up to 8 sessions per year)  - Counseling greater than 3 and up to 10 minutes  - Counseling greater than 10 minutes Patients must be asymptomatic of tobacco-related conditions to receive as preventive service  NA   Diabetes Screening Tests (at least every 3 years, Medicare covers annually or at 6-month intervals for prediabetic patients)    Fasting blood sugar (FBS) or glucose tolerance test (GTT) Patient must be diagnosed with one of the following:  -Hypertension, Dyslipidemia, obesity, previous impaired FBS or GTT  Or any two of the following: overweight, FH of diabetes, age ? 72, history of gestational diabetes, birth of baby weighing more than 9 pounds Every Year   Due:  6/6/2017                               Diabetes Self-Management Training (DSMT) (no USPSTF recommendation) Requires referral by treating physician for patient with diabetes or renal disease. 10 hours of initial DSMT session of no less than 30 minutes each in a continuous 12-month period. 2 hours of follow-up DSMT in subsequent years. N/A   Glaucoma Screening (no USPSTF recommendation) Diabetes mellitus, family history, , age 48 or over,  American, age 72 or over Every 2 Years   Due: 4/25/2018   Human Immunodeficiency Virus (HIV) Screening (annually for increased risk patients)  HIV-1 and HIV-2 by EIA, KANDY, rapid antibody test, or oral mucosa transudate Patient must be at increased risk for HIV infection per USPSTF guidelines or pregnant. Tests covered annually for patients at increased risk. Pregnant patients may receive up to 3 test during pregnancy. NA   Medical Nutrition Therapy (MNT) (for diabetes or renal disease not recommended schedule) Requires referral by treating physician for patient with diabetes or renal disease. Can be provided in same year as diabetes self-management training (DSMT), and CMS recommends medical nutrition therapy take place after DSMT. Up to 3 hours for initial year and 2 hours in subsequent years.   NA   Seasonal Influenza Vaccination (annually)  Seasonally (9 months)   Due: 7/2017   Pneumococcal Vaccination (once after 72)   completed             Hepatitis B Vaccinations (if medium/high risk) Medium/high risk factors:  End-stage renal disease,  Hemophiliacs who received Factor VIII or IX concentrates, Clients of institutions for the mentally retarded, Persons who live in the same house as a HepB virus carrier, Homosexual men, Illicit injectable drug abusers. NA   Screening Mammography (biennial age 54-69)? Annually (age 36 or over) Yearly   Due: 3/21/2018   Screening Pap Tests and Pelvic Examination (up to age 79 and after 79 if unknown history or abnormal study last 10 years) Every 24 months except high risk Every 2 years     NA   Ultrasound Screening for Abdominal Aortic Aneurysm (AAA) (once) Patient must be referred through IPPE and not have had a screening for abdominal aortic aneurysm before under Medicare. Limited to patients who meet one of the following criteria:  - Men who are 73-68 years old and have smoked more than 100 cigarettes in their lifetime.  -Anyone with a FH of AAA  -Anyone recommended for screening by USPSTF  NA       Follow-up Disposition:  Return in about 1 month (around 5/6/2017) for diabetes, check blood pressure. Reviewed plan of care. Patient has provided input and agrees with goals. Do not know

## 2024-10-22 NOTE — ED PROVIDER NOTE - PSYCHIATRIC MOOD
appropriate Hold diuretics for now   Resume in a day or so as pancreatitis improved and diet can be advanced   c/w coreg , digoxin   Repeat ECHO ( last one 1 year ago, shows pEF , moderate PHTN)

## 2024-10-22 NOTE — ED BEHAVIORAL HEALTH ASSESSMENT NOTE - PSYCHIATRIC ISSUES AND PLAN (INCLUDE STANDING AND PRN MEDICATION)
Increase Prozac to 60mg, c/w Abilify 10mg po daily, Atarax 50mg po q6hrs Prn for anxiety. Ativan 2mg po/im q6hrs PRN for severe agitation secondary to mood destabilizaiton
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
## 2024-10-22 NOTE — ED BEHAVIORAL HEALTH ASSESSMENT NOTE - DESCRIPTION
calm, cooperative. no prns were needed.  Vital Signs Last 24 Hrs  T(C): 36.7 (22 Oct 2024 16:28), Max: 36.7 (22 Oct 2024 16:28)  T(F): 98.1 (22 Oct 2024 16:28), Max: 98.1 (22 Oct 2024 16:28)  HR: 71 (22 Oct 2024 16:28) (71 - 71)  BP: 120/79 (22 Oct 2024 16:28) (120/79 - 120/79)  BP(mean): --  RR: 18 (22 Oct 2024 16:28) (18 - 18)  SpO2: 95% (22 Oct 2024 16:28) (95% - 95%)    Parameters below as of 22 Oct 2024 16:28  Patient On (Oxygen Delivery Method): room air Lives with mother, step-father, and sister. Current psychology major at [employer], None

## 2024-10-22 NOTE — ED PROVIDER NOTE - PSYCHIATRIC MEMORY
Telephone Encounter by Prince Stein Kerry Ashton [employer] Rayo at 05/10/18 12:40 PM     Author:  Prince Stein Kerry Ashton Deliver[employer]on Cade Service:  (none) Author Type:  Certified Medical Assistant     Filed:  05/10/18 12:41 PM Encounter Date:  5/8/2018 Status:  Signed     :  Prince Stein Kerry Ashton Del[employer] Rayo (Certified Medical Assistant)            Fwd to Dr. Gracia Tovar for the CT to be done on 5/18? Or does it need to be done sooner? [BR1.1M]       Revision History        User Key Date/Time User Provider Type Action    > BR1.1 05/10/18 12:41 PM Prince Stein Kerry Ashton [employer] Ray[employer] Certified Medical Assistant Sign    M - Manual short term intact/long term intact

## 2024-10-22 NOTE — ED BEHAVIORAL HEALTH ASSESSMENT NOTE - OTHER PAST PSYCHIATRIC HISTORY (INCLUDE DETAILS REGARDING ONSET, COURSE OF ILLNESS, INPATIENT/OUTPATIENT TREATMENT)
Hx depression, anxiety. Pt has 2 prior suicide attempt about 1.5 years ago and in 05/2024, attempted with Benadryl overdose and called suicide hotline followed by Tylenol OD. Did not receive medical attention following first attempt.   Currently in tx with Dr. Larsen and Jesusita at [facility]

## 2024-10-22 NOTE — ED PROVIDER NOTE - IV ALTEPLASE ADMIN OUTSIDE HIDDEN
show PRINCIPAL DISCHARGE DIAGNOSIS  Diagnosis: Thyroid nodule  Assessment and Plan of Treatment:

## 2024-10-22 NOTE — ED BEHAVIORAL HEALTH ASSESSMENT NOTE - RISK ASSESSMENT
rf:  mental health condition(s)  substance abuse. si, depression, poor sleep, impulsivity   suicide attempts    pf:  no access to firearms  help seeking
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
## 2024-10-22 NOTE — BH PATIENT PROFILE - HOME MEDICATIONS
hydrOXYzine hydrochloride 50 mg oral tablet , 1 tab(s) orally 3 times a day as needed for Anxiety  ARIPiprazole 5 mg oral tablet , 1 tab(s) orally once a day  FLUoxetine 40 mg oral capsule , 1 cap(s) orally once a day

## 2024-10-22 NOTE — ED BEHAVIORAL HEALTH ASSESSMENT NOTE - NSSUICPROTFACT_PSY_ALL_CORE
[facility]   PM&R clinic note        Interval history:     Olga Bailey presents to clinic today for follow up reg her rehab needs.   She has h/o left frontoparietal glioblastoma (IDH wild-type, MGMT promoter methylated).  Was last seen in clinic on 11/30/21.  Recommendations included:  1. Work-up: None  2. Therapy/equipment/braces:  1. Outpatient physical therapy for leg strengthening and aerobic exercise conditioning.  2. Cognitive therapy for memory and concentration  3. Medications:  1. Dulcolax suppository PRN  4. Interventions: No additional  5. Referral / follow up with other providers: See therapies above.  6. Follow up: 3-4 months     Oncologic History:    2/2021 PRESENTATION: Progressive aphasia.     2/19/2021 MR brain imaging with 3.3 x 2.8 x 2.8 cm enhancing mass in left frontal-parietal region. A second contrast enhancing lesion (0.5 x 1.0 x 1.0 cm) in right occipital lobe which is dural based and largely stable in size since 9/2011; likely representing a meningioma.    2/23/2021 SURGERY: Gross total resection by Dr. Cummings    PATHOLOGY: Glioblastoma; IDH1-R132H wild-type/ IDH 1 and 2 wildtype, MGMT promoter methylated. Not BRAF mutated.     3/23/2021 NEURO-ONC: Recommending chemoradiotherapy.     3/2021 ADMISSION: SOB and chest pain; CT PA negative, but bilateral DVT noted on U/S. Started on Lovenox. Acute hypoxic respiratory failure in the setting of bilateral pneumonia; presumed PJP, concern for transfusion-related acute lung injury and right hemidiaphragm paralysis. Seizure. Right retroperitoneal hematoma. Ileus. Non-severe malnutrition on TPN with concern for refeeding syndrome. Mild hyponatremia likely 2/2 SIADH. Shock Liver.    5/4 - 5/27/2021 RADS: 15 fractions.     5/25/2021 NEURO-ONC/ DEVICE: Discussed the role of Optune; to be considered. Repeat MRI in 4 weeks with plans to start adjuvant temozolomide.     6/22/2021 NEURO-ONC/ MRB/ CHEMO: Clinically  improving. Imaging largely stable. Starting adjuvant temozolomide 150mg/m2 (250mg), cycle 1 (start date 6/24).     7/20/2021 NEURO-ONC/ CHEMO: Clinically improving. Thrombocytopenia noted with adjuvant temozolomide dosing, platelets of 26K; will transition to metronomic dosing 50mg/m2 (100mg) daily to start once platelets are > 80K.      8/17/2021 NEURO-ONC/ MRB/ CHEMO: Clinically improving. Saw Dr. Gamboa, who recommended starting anticoagulation; on Eliquis. Imaging with positive treatment response. Continue metronomic/ daily dosing at 50mg/m2 (100mg) through 12/2021.      9/14/2021 NEURO-ONC/ CHEMO: Clinically improving. Continue metronomic/ daily dosing at 50mg/m2 (100mg) through 12/2021.      10/12/2021 NEURO-ONC/ CHEMO: Clinically improving. Holding temozolomide and Zofran in the setting of severe constipation. Abdominal x-ray with high stool burden; consulted Dr. Hodge for management of constipation given history of ileus.     10/28/2021 CHEMO: Temozolomide restarted.     11/16/2021 NEURO-ONC/ MRB/ CHEMO: Clinically improving Less constipation, continued low appetite; referral to palliative care for mental health management. Referral to Dr. Agudelo to optimize rehab. Imaging with continued positive treatment response. Continue daily temozolomide.     3/1/2022 follow-up with Dr. Gay-patient doing overall okay.  Sleep and energy are better.  Following with palliative care and has Prozac and BuSpar, and mood is better.  Home therapies had stopped about 4 weeks ago, and interested in restarting.  She has had recent fall due to instability in her gait when she presented to the ER and imaging did not reveal any head injury or other injuries that were sustained.  Also presented to the ER as was leaning to the right, and there was concern for stroke however stroke work-up was negative.      Symptoms,  Patient presents for a return visit with her daughter La Nena today.  Patient has had 2 recent episodes where she had  to present to the ER.  She had a fall last week and presented to the ER, hit her head with the fall.  Imaging did not reveal any injuries.  In addition, patient presented to the ER again when she was at family's house and was noting to be slumped to the right.  There was a concern for stroke, stroke work-up was negative.  Patient and her daughter states that home PT and OT was stopped as of 4 weeks ago, as they felt that she had plateaued with her progress.  Since then, patient has had a decline in her mobility with decreased balance, increased weakness and increased right-sided neglect and incoordination.  This has severely affected her mobility and ADLs.      Therapies/HEP,  Previously participating in home PT and OT, but was discharged 4 weeks ago.      Functionally,   Patient has had a significant decline in her mobility, with increased balance difficulties, generalized weakness, right-sided neglect and incoordination all affecting her mobility and ADLs over the last 4 weeks.  This has resulted in 2 ER visits.      Social history is unchanged.      Medications:  Current Outpatient Medications   Medication Sig Dispense Refill     acetaminophen (TYLENOL) 325 MG tablet Take 650 mg by mouth every 4 hours as needed for mild pain        albuterol (PROAIR HFA/PROVENTIL HFA/VENTOLIN HFA) 108 (90 Base) MCG/ACT inhaler Inhale 2 puffs into the lungs every 4 hours as needed for wheezing 18 g 3     albuterol (PROVENTIL) (2.5 MG/3ML) 0.083% neb solution Take 1 vial (2.5 mg) by nebulization every 4 hours as needed for wheezing or shortness of breath / dyspnea       amLODIPine (NORVASC) 5 MG tablet Take 1 tablet (5 mg) by mouth daily       buPROPion (WELLBUTRIN XL) 150 MG 24 hr tablet Take 450 mg by mouth       busPIRone HCl (BUSPAR) 30 MG tablet Take 30 mg by mouth 2 times daily       calcium polycarbophil (FIBERCON) 625 MG tablet Take 1 tablet (625 mg) by mouth daily       famotidine (PEPCID) 20 MG tablet Take 20 mg by mouth        FLUoxetine (PROZAC) 20 MG capsule Take 3 capsules (60 mg) by mouth daily 30 capsule 0     furosemide (LASIX) 40 MG tablet Take 1 tablet (40 mg) by mouth daily 30 tablet 0     levETIRAcetam (KEPPRA) 1000 MG tablet TAKE 1 TAB BY MOUTH TWICE A DAY W/250mg=1,250mg       levETIRAcetam (KEPPRA) 250 MG tablet Take 5 tablets (1,250 mg) by mouth 2 times daily 60 tablet 0     loperamide (IMODIUM) 2 MG capsule TAKE 2 CAPSULES (4MG) BY MOUTH AFTER FIRST LOOSE STOOL, THEN;TAKE 1 CAPSULE AFTER CONSECUTIVE STOOLS, MAX 4 CAPS/24H       melatonin 3 MG tablet Take 2 tablets (6 mg) by mouth nightly as needed for sleep 30 tablet 0     Nutritional Supplements (ENSURE CLEAR) LIQD TAKE ONE CAN BY MOUTH TWICE DAILY IN BETWEEN MEALS FOR WEIGHT LOSS       OLANZapine (ZYPREXA) 10 MG tablet TAKE 1/2 TAB (5mg) BY MOUTH AT BEDTIME       OLANZapine (ZYPREXA) 5 MG tablet Take 1 tablet every night at bedtime. Can take and additional 1/2-1 full tablet during the day as needed for anxiety/agitated. 60 tablet 1     ondansetron (ZOFRAN) 4 MG tablet Take 1 tablet (4 mg) by mouth every 8 hours as needed for nausea 30 tablet 3     pantoprazole (PROTONIX) 40 MG EC tablet Take 1 tablet (40 mg) by mouth 2 times daily (before meals) 30 tablet 0     polyethylene glycol (MIRALAX) 17 GM/Dose powder Take 17 g by mouth daily as needed for constipation 510 g 0     QUEtiapine (SEROQUEL) 50 MG tablet TAKE 1 TAB BY MOUTH AT BEDTIME       rivaroxaban ANTICOAGULANT (XARELTO ANTICOAGULANT) 20 MG TABS tablet Take 1 tablet (20 mg) by mouth daily (with dinner) 30 tablet 3              Physical Exam:   /81   Pulse 86   Temp 98  F (36.7  C) (Oral)   Resp 16   SpO2 96%   Gen: NAD, pleasant and cooperative   HEENT: Normocephalic  Pulm: non-labored breathing in room air.  No cough.  Ext: WWP, no edema in BLE, no tenderness in calves.  Neuro/MSK:   Appearance: Left shoulder is elevated higher than right shoulder  ROM: AROM right shoulder abduction 110 degrees, PROM  170 degrees  Strength: Right shoulder abduction 3/5, right elbow flexion 4/5, right  strength 4/5, right hip flexion 4 -/5. All other manual muscle testing 4+/5.  Sensation: Symmetric to light touch bilaterally.  Coordination: Left finger-to-nose impaired, right finger-to-nose intact.      Labs/Imaging:  Lab Results   Component Value Date    WBC 5.4 02/14/2022    HGB 10.6 (L) 02/14/2022    HCT 33.5 (L) 02/14/2022     (H) 02/14/2022     02/14/2022     Lab Results   Component Value Date     02/14/2022    POTASSIUM 4.1 02/14/2022    CHLORIDE 107 02/14/2022    CO2 30 02/14/2022    GLC 95 02/14/2022     Lab Results   Component Value Date    GFRESTIMATED 38 (L) 02/14/2022    GFRESTBLACK >90 07/09/2021     Lab Results   Component Value Date    AST 12 02/03/2022    ALT <9 02/03/2022    ALKPHOS 90 02/03/2022    BILITOTAL 0.4 02/03/2022     Lab Results   Component Value Date    INR 1.44 (H) 02/14/2022     Lab Results   Component Value Date    BUN 31 (H) 02/14/2022    CR 1.44 (H) 02/14/2022              Assessment/Plan   Olga Bailey presents to clinic today for follow up reg her rehab needs. She has h/o left frontoparietal wound glioblastoma (IDH wild-type, MGMT promoter methylated).  Was last seen in clinic on 11/30/21.  As discussed with Olga and La Nena, she has had a significant decline in her functional mobility over the last several weeks since she last stopped home therapies.  She has increased generalized weakness, more right upper and lower extremity weakness, increased right-sided neglect and impaired coordination that are all affecting her mobility and ADLs.  Strongly recommend reinitiating home PT and OT to help with ongoing targeted strengthening, balance, gait and activities of daily living including fine motor activities.  We will have these orders placed and faxed to the appropriate home care agency.  We will plan for a return visit in 2 months when patient sees Dr. Baker in  clinic.  Patient and her daughter are in agreement with this plan.      1. Therapy/equipment/braces:  1. Restart home PT and OT for worsened functional mobility, right-sided neglect and impaired coordination severely affecting mobility and ADLs.  2. Follow up: 2 months.      Yesenia Agudelo MD  Physical Medicine & Rehabilitation      50 minutes spent on the date of the encounter doing chart review, history and exam, documentation and further activities as noted above.           Supportive social network of family or friends/Engaged in work or school

## 2024-10-22 NOTE — ED BEHAVIORAL HEALTH NOTE - BEHAVIORAL HEALTH NOTE
Writer contacted the patient's father, Mushtaq ([phone]), to gather collateral information. He provided the following information:    Patient Information: The patient is a 19-year-old female living with her mother,  and 6-year-old sister. No safety concerns were reported within the home environment. She has a history of bipolar disorder, anxiety, and depression, and is currently employed at a restaurant.    Reason for ED Visit: Worsening depression and suicidal ideation (SI) with a possible plan. The patient spoke with a counselor today and agreed to come to the ED.    Symptoms/History: The patient's father reports she is experiencing significant depression and anxiety, endorsing SI with statements about not wanting to live. These thoughts have been frequent lately, accompanied by a plan to overdose on pills. Six months ago, she was treated at [facility] for a suicide attempt via overdose. Her sleep schedule is disrupted, sleeping late and throughout the day, and her appetite has decreased, though her hygiene remains good. Currently, she denies auditory or visual hallucinations or delusions. However, she has experienced auditory hallucinations in the past, attributed to previous medication during a prior inpatient hospitalization. She appears extremely tired and lethargic at times. Over the past few weeks, she has exhibited decreased motivation. She is currently on a school break from [employer], where she was last enrolled in Spring 2024.    Medical Problems: Low B12.    Medications: Reportedly compliant with her medications, though her father is unable to provide a list. The patient can provide this information.    Treatment Team: Her last admission was to [facility] six months ago. She is linked to [facility] in [place], though her father does not know her provider's name.    Baseline: Typically happy, with periods of high energy, and generally appears as normal as possible.    Stressors: Social life.    Drug/Alcohol Use: Occasional social drinking.    Family History: No pertinent family history reported.    Violence/Aggression: The patient is not violent or aggressive and does not have access to firearms or weapons.    Disposition: Her father is advocating for voluntary admission. Writer contacted the patient's father, Mushtaq ([phone]), to gather collateral information. He provided the following information:    Patient Information: The patient is a 19-year-old female living with her mother,  and 6-year-old sister. No safety concerns were reported within the home environment. She has a history of bipolar disorder, anxiety, and depression, and is currently employed at a restaurant.    Reason for ED Visit: Worsening depression and suicidal ideation (SI) with a possible plan. The patient spoke with a counselor today and agreed to come to the ED.    Symptoms/History: The patient's father reports she is experiencing significant depression and anxiety, endorsing SI with statements about not wanting to live. These thoughts have been frequent lately, accompanied by a plan to overdose on pills. Six months ago, she was treated at [facility] for a suicide attempt via overdose. Her sleep schedule is disrupted, sleeping late and throughout the day, and her appetite has decreased, though her hygiene remains good. Currently, she denies auditory or visual hallucinations or delusions. However, she has experienced auditory hallucinations in the past, attributed to previous medication during a prior inpatient hospitalization. She appears extremely tired and lethargic at times. Over the past few weeks, she has exhibited decreased motivation. She is currently on a school break from [employer], where she was last enrolled in Spring 2024.    Medical Problems: Low B12.    Medications: Reportedly compliant with her medications, though her father is unable to provide a list. The patient can provide this information.    Treatment Team: Her last admission was to [facility] six months ago. She is linked to [facility] in [place], though her father does not know her provider's name.    Baseline: Typically happy, with periods of high energy, and generally appears as normal as possible.    Stressors: Social life.    Drug/Alcohol Use: Occasional social drinking.    Family History: No pertinent family history reported.    Violence/Aggression: The patient is not violent or aggressive and does not have access to firearms or weapons.    Disposition: Her father is advocating for voluntary admission.     Writer informed father of patient's admission to [facility].     Writer contacted [facility] in [place] [phone] spoke Nikki clinical director  and  informed her of patient’s admission to [facility].  pt’s therapist’s name is  cee burdick, psych NP Chava ahumada , dx of borderline personality disorder, MDD, PTSD and cannabis use disorder. Medication includes Abilify 10mg 1 tab every morning, Prozac 40mg 1 tab every morning, Vistaril 50mg 1 tab at night time.

## 2024-10-22 NOTE — ED ADULT NURSE NOTE - HOW OFTEN DO YOU HAVE A DRINK CONTAINING ALCOHOL?
Reassessment: Pt continues eating well on Vegetarian diet, documented with

mostly 100% PO intake while receiving double protein TID. LBM 11/16,

receiving routine bowel care with PRN bowel care available. No nutrition

intervention implemented at this time. Will continue to follow.

Recommendations:

1) Continue Vegetarian diet per pt request

2) Double protein TID

3) Bowel care PRN

4) Weekly scaled weights

-------------------------------------------------------------------------------

Addendum: 11/17/22 at 0728 by Loc Villalba RD

-------------------------------------------------------------------------------

Amended: Links added. Never

## 2024-10-22 NOTE — ED PROVIDER NOTE - CLINICAL SUMMARY MEDICAL DECISION MAKING FREE TEXT BOX
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 Endorses previous si attempt od on tylenol.  She is  seeking for psychiatric admission. Reports compliant with medication Prozac, Abilify, and hydroxyzine.  psych clearance, and psych recommendation voluntary adm.

## 2024-10-22 NOTE — BH PATIENT PROFILE - FUNCTIONAL ASSESSMENT - BASIC MOBILITY 6.
Ace wrap, ice, rest, elevate, Tylenol and ibuprofen over-the-counter as needed for pain.  Crutches to use as needed also.    Follow-up with primary care doctor for recheck in 1 week if symptoms persist or fail to improve.    Return to the emergency department if pain out of proportion, numbness occur.  
4 = No assist / stand by assistance
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
## 2024-10-23 LAB
A1C WITH ESTIMATED AVERAGE GLUCOSE RESULT: 4.9 % — SIGNIFICANT CHANGE UP (ref 4–5.6)
CHOLEST SERPL-MCNC: 195 MG/DL — SIGNIFICANT CHANGE UP
CULTURE RESULTS: SIGNIFICANT CHANGE UP
ESTIMATED AVERAGE GLUCOSE: 94 — SIGNIFICANT CHANGE UP
HDLC SERPL-MCNC: 50 MG/DL — LOW
LIPID PNL WITH DIRECT LDL SERPL: 127 MG/DL — HIGH
NON HDL CHOLESTEROL: 145 MG/DL — HIGH
SPECIMEN SOURCE: SIGNIFICANT CHANGE UP
TRIGL SERPL-MCNC: 92 MG/DL — SIGNIFICANT CHANGE UP

## 2024-10-23 PROCEDURE — 99222 1ST HOSP IP/OBS MODERATE 55: CPT

## 2024-10-23 RX ORDER — HYDROXYZINE HCL 25 MG
50 TABLET ORAL DAILY
Refills: 0 | Status: DISCONTINUED | OUTPATIENT
Start: 2024-10-23 | End: 2024-11-08

## 2024-10-23 RX ORDER — MELATONIN 5 MG
3 TABLET ORAL AT BEDTIME
Refills: 0 | Status: DISCONTINUED | OUTPATIENT
Start: 2024-10-23 | End: 2024-11-01

## 2024-10-23 RX ADMIN — ARIPIPRAZOLE 10 MILLIGRAM(S): 2 TABLET ORAL at 08:33

## 2024-10-23 RX ADMIN — Medication 60 MILLIGRAM(S): at 08:32

## 2024-10-23 RX ADMIN — Medication 3 MILLIGRAM(S): at 21:06

## 2024-10-23 NOTE — BH INPATIENT PSYCHIATRY ASSESSMENT NOTE - OTHER PAST PSYCHIATRIC HISTORY (INCLUDE DETAILS REGARDING ONSET, COURSE OF ILLNESS, INPATIENT/OUTPATIENT TREATMENT)
Hx depression, anxiety. Pt has 2 prior suicide attempt about 1.5 years ago and in 05/2024, attempted with Benadryl overdose and called suicide hotline followed by Tylenol OD. Did not receive medical attention following first attempt.   Currently in tx with Dr. Larsen and Jesusita at [facility] Hx depression, anxiety. Pt has 2 prior suicide attempt about 1.5 years ago and in 05/2024, attempted with Benadryl overdose and called suicide hotline followed by Tylenol OD. Did not receive medical attention following first attempt.   Currently in tx with Jesusita Méndez and Chava Whitt at [facility] in [place].

## 2024-10-23 NOTE — BH INPATIENT PSYCHIATRY ASSESSMENT NOTE - NSBHCHARTREVIEWVS_PSY_A_CORE FT
Vital Signs Last 24 Hrs  T(C): 36.7 (10-23-24 @ 08:28), Max: 36.8 (10-22-24 @ 21:20)  T(F): 98.1 (10-23-24 @ 08:28), Max: 98.3 (10-22-24 @ 21:20)  HR: 71 (10-22-24 @ 21:20) (71 - 71)  BP: 115/76 (10-22-24 @ 21:20) (115/76 - 120/79)  BP(mean): --  RR: 17 (10-23-24 @ 07:39) (16 - 18)  SpO2: 99% (10-22-24 @ 22:07) (95% - 99%)    Orthostatic VS  10-23-24 @ 08:28  Lying BP: --/-- HR: --  Sitting BP: 112/64 HR: 92  Standing BP: 112/72 HR: 82  Site: --  Mode: --  Orthostatic VS  10-22-24 @ 22:07  Lying BP: --/-- HR: --  Sitting BP: 122/83 HR: 79  Standing BP: 129/75 HR: 87  Site: --  Mode: --   Vital Signs Last 24 Hrs  T(C): 36.3 (10-24-24 @ 08:50), Max: 36.6 (10-23-24 @ 19:06)  T(F): 97.3 (10-24-24 @ 08:50), Max: 97.9 (10-23-24 @ 19:06)  HR: --  BP: --  BP(mean): --  RR: --  SpO2: --    Orthostatic VS  10-24-24 @ 08:50  Lying BP: --/-- HR: --  Sitting BP: 107/63 HR: 83  Standing BP: 107/66 HR: 95  Site: upper left arm  Mode: electronic  Orthostatic VS  10-23-24 @ 19:06  Lying BP: --/-- HR: --  Sitting BP: 108/72 HR: 100  Standing BP: 109/73 HR: 106  Site: --  Mode: --  Orthostatic VS  10-23-24 @ 08:28  Lying BP: --/-- HR: --  Sitting BP: 112/64 HR: 92  Standing BP: 112/72 HR: 82  Site: --  Mode: --  Orthostatic VS  10-22-24 @ 22:07  Lying BP: --/-- HR: --  Sitting BP: 122/83 HR: 79  Standing BP: 129/75 HR: 87  Site: --  Mode: --

## 2024-10-23 NOTE — BH SOCIAL WORK INITIAL PSYCHOSOCIAL EVALUATION - NSCMSPTSTRENGTHS_PSY_ALL_CORE
Compliance to treatment/Expressive of emotions/Future/goal oriented/Intact family/Interpersonal skills/Positive attitude

## 2024-10-23 NOTE — BH SOCIAL WORK INITIAL PSYCHOSOCIAL EVALUATION - NSBHSPIRITUAL_PSY_ALL_CORE
Quality 111:Pneumonia Vaccination Status For Older Adults: Pneumococcal Vaccination not Administered or Previously Received, Reason not Otherwise Specified
Detail Level: Detailed
Quality 110: Preventive Care And Screening: Influenza Immunization: Influenza Immunization Administered during Influenza season
Quality 130: Documentation Of Current Medications In The Medical Record: Current Medications Documented
Yes

## 2024-10-23 NOTE — BH TREATMENT PLAN - NSTXDCOTHRINTERSW_PSY_ALL_CORE
SW will provide support, insight, discharge planning, psychoeducation, and maintain contact with identified supports.

## 2024-10-23 NOTE — BH INPATIENT PSYCHIATRY ASSESSMENT NOTE - NSBHMETABOLIC_PSY_ALL_CORE_FT
BMI: BMI (kg/m2): 18.7 (10-22-24 @ 22:07)  HbA1c: A1C with Estimated Average Glucose Result: 4.9 % (10-23-24 @ 10:12)    Glucose:   BP: 115/76 (10-22-24 @ 21:20) (115/76 - 120/79)Vital Signs Last 24 Hrs  T(C): 36.7 (10-23-24 @ 08:28), Max: 36.8 (10-22-24 @ 21:20)  T(F): 98.1 (10-23-24 @ 08:28), Max: 98.3 (10-22-24 @ 21:20)  HR: 71 (10-22-24 @ 21:20) (71 - 71)  BP: 115/76 (10-22-24 @ 21:20) (115/76 - 120/79)  BP(mean): --  RR: 17 (10-23-24 @ 07:39) (16 - 18)  SpO2: 99% (10-22-24 @ 22:07) (95% - 99%)    Orthostatic VS  10-23-24 @ 08:28  Lying BP: --/-- HR: --  Sitting BP: 112/64 HR: 92  Standing BP: 112/72 HR: 82  Site: --  Mode: --  Orthostatic VS  10-22-24 @ 22:07  Lying BP: --/-- HR: --  Sitting BP: 122/83 HR: 79  Standing BP: 129/75 HR: 87  Site: --  Mode: --    Lipid Panel: Date/Time: 10-23-24 @ 10:12  Cholesterol, Serum: 195  LDL Cholesterol Calculated: 127  HDL Cholesterol, Serum: 50  Total Cholesterol/HDL Ration Measurement: --  Triglycerides, Serum: 92   BMI: BMI (kg/m2): 18.7 (10-22-24 @ 22:07)  HbA1c: A1C with Estimated Average Glucose Result: 4.9 % (10-23-24 @ 10:12)    Glucose:   BP: 115/76 (10-22-24 @ 21:20) (115/76 - 120/79)Vital Signs Last 24 Hrs  T(C): 36.3 (10-24-24 @ 08:50), Max: 36.6 (10-23-24 @ 19:06)  T(F): 97.3 (10-24-24 @ 08:50), Max: 97.9 (10-23-24 @ 19:06)  HR: --  BP: --  BP(mean): --  RR: --  SpO2: --    Orthostatic VS  10-24-24 @ 08:50  Lying BP: --/-- HR: --  Sitting BP: 107/63 HR: 83  Standing BP: 107/66 HR: 95  Site: upper left arm  Mode: electronic  Orthostatic VS  10-23-24 @ 19:06  Lying BP: --/-- HR: --  Sitting BP: 108/72 HR: 100  Standing BP: 109/73 HR: 106  Site: --  Mode: --  Orthostatic VS  10-23-24 @ 08:28  Lying BP: --/-- HR: --  Sitting BP: 112/64 HR: 92  Standing BP: 112/72 HR: 82  Site: --  Mode: --  Orthostatic VS  10-22-24 @ 22:07  Lying BP: --/-- HR: --  Sitting BP: 122/83 HR: 79  Standing BP: 129/75 HR: 87  Site: --  Mode: --    Lipid Panel: Date/Time: 10-23-24 @ 10:12  Cholesterol, Serum: 195  LDL Cholesterol Calculated: 127  HDL Cholesterol, Serum: 50  Total Cholesterol/HDL Ration Measurement: --  Triglycerides, Serum: 92

## 2024-10-23 NOTE — BH INPATIENT PSYCHIATRY ASSESSMENT NOTE - CURRENT MEDICATION
MEDICATIONS  (STANDING):  ARIPiprazole 10 milliGRAM(s) Oral daily  FLUoxetine 60 milliGRAM(s) Oral daily    MEDICATIONS  (PRN):  hydrOXYzine hydrochloride 50 milliGRAM(s) Oral every 6 hours PRN Anxiety  LORazepam     Tablet 2 milliGRAM(s) Oral every 6 hours PRN agitation secondary to mood destabilization  LORazepam   Injectable 2 milliGRAM(s) IntraMuscular Once PRN severe agitation secondary to mood destabilization   MEDICATIONS  (STANDING):  ARIPiprazole 10 milliGRAM(s) Oral daily  FLUoxetine 60 milliGRAM(s) Oral daily  hydrOXYzine hydrochloride 50 milliGRAM(s) Oral daily  melatonin. 3 milliGRAM(s) Oral at bedtime    MEDICATIONS  (PRN):  hydrOXYzine hydrochloride 50 milliGRAM(s) Oral every 6 hours PRN Anxiety  LORazepam     Tablet 2 milliGRAM(s) Oral every 6 hours PRN agitation secondary to mood destabilization  LORazepam   Injectable 2 milliGRAM(s) IntraMuscular Once PRN severe agitation secondary to mood destabilization
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
## 2024-10-23 NOTE — LEGAL STATUS PROGRESS NOTE - NSLEGALSTATUS_PSY_ALL_CORE
08/12/23 0304   Blood Pressure   BP (!) 61/39   MAP (mmHg) (!) 46     Shakir STARKS aware. New orders placed   9.13 Voluntary
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
## 2024-10-23 NOTE — BH INPATIENT PSYCHIATRY ASSESSMENT NOTE - NSTXDCOTHRGOAL_PSY_ALL_CORE
Pt will show a reduction of symptoms and engage meaningfully with writer to identify a safe discharge plan. Pt will comply with recommended tx plan and medications for 5 days. Identify 2 benefits for adherring to tx.

## 2024-10-23 NOTE — BH INPATIENT PSYCHIATRY ASSESSMENT NOTE - ATTENDING COMMENTS
Chart was reviewed and case discussed with the Psych NP. I agree with the history, examination, assessment and plan as documented in the Psych NP's assessment note and was directly involved in medical decision making.

## 2024-10-23 NOTE — BH INPATIENT PSYCHIATRY ASSESSMENT NOTE - DESCRIPTION
Lives with mother, step-father, and sister. Current psychology major at [employer],

## 2024-10-24 LAB
FOLATE SERPL-MCNC: 11.9 NG/ML — SIGNIFICANT CHANGE UP (ref 3.1–17.5)
VIT B12 SERPL-MCNC: 528 PG/ML — SIGNIFICANT CHANGE UP (ref 200–900)

## 2024-10-24 PROCEDURE — 90853 GROUP PSYCHOTHERAPY: CPT

## 2024-10-24 PROCEDURE — 99232 SBSQ HOSP IP/OBS MODERATE 35: CPT

## 2024-10-24 PROCEDURE — 90834 PSYTX W PT 45 MINUTES: CPT | Mod: 59

## 2024-10-24 RX ORDER — ARIPIPRAZOLE 2 MG/1
15 TABLET ORAL DAILY
Refills: 0 | Status: DISCONTINUED | OUTPATIENT
Start: 2024-10-25 | End: 2024-10-27

## 2024-10-24 RX ORDER — ACETAMINOPHEN 500 MG
650 TABLET ORAL EVERY 6 HOURS
Refills: 0 | Status: DISCONTINUED | OUTPATIENT
Start: 2024-10-24 | End: 2024-11-08

## 2024-10-24 RX ADMIN — Medication 650 MILLIGRAM(S): at 18:15

## 2024-10-24 RX ADMIN — Medication 60 MILLIGRAM(S): at 08:22

## 2024-10-24 RX ADMIN — Medication 3 MILLIGRAM(S): at 21:18

## 2024-10-24 RX ADMIN — Medication 50 MILLIGRAM(S): at 08:21

## 2024-10-24 RX ADMIN — ARIPIPRAZOLE 10 MILLIGRAM(S): 2 TABLET ORAL at 08:21
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
## 2024-10-24 NOTE — BH INPATIENT PSYCHIATRY PROGRESS NOTE - NSBHFUPINTERVALHXFT_PSY_A_CORE
Chart and history reviewed and discussed with treatment team. No events reported overnight. Pt presented guarded on approach, stating that she feels her depression is worse in the context of poor sleep due to roommate. Pt reports that she thought about suffocating herself with a pillow last night and has been thinking about ways to hang herself on the unit, though states "my body wont let me hurt myself." Pt denies intent to follow through with plan. Discussed with MD, no indication for CO at this time. Pt states that she feels safe on the unit and is in agreement to come to staff for worsening mood. Pt reports +AH of worthlessness, in a demon voice, and in agreement to increase abilify for tomorrow. SE reviewed, denying akathesia when first starting though reports prior daytime sleepiness. Will plan to monitor for SE. No behavioral issues noted. Medication compliant, tolerating well, free of EPS. Sleep and appetite maintained. Continue to monitor for safety.     NP attempted to contact pt's psych NP, who is out of the office till Monday. Will attempt next week.

## 2024-10-24 NOTE — BH INPATIENT PSYCHIATRY PROGRESS NOTE - NSBHCHARTREVIEWVS_PSY_A_CORE FT
Vital Signs Last 24 Hrs  T(C): 36.3 (10-24-24 @ 08:50), Max: 36.6 (10-23-24 @ 19:06)  T(F): 97.3 (10-24-24 @ 08:50), Max: 97.9 (10-23-24 @ 19:06)  HR: --  BP: --  BP(mean): --  RR: --  SpO2: --    Orthostatic VS  10-24-24 @ 08:50  Lying BP: --/-- HR: --  Sitting BP: 107/63 HR: 83  Standing BP: 107/66 HR: 95  Site: upper left arm  Mode: electronic  Orthostatic VS  10-23-24 @ 19:06  Lying BP: --/-- HR: --  Sitting BP: 108/72 HR: 100  Standing BP: 109/73 HR: 106  Site: --  Mode: --  Orthostatic VS  10-23-24 @ 08:28  Lying BP: --/-- HR: --  Sitting BP: 112/64 HR: 92  Standing BP: 112/72 HR: 82  Site: --  Mode: --  Orthostatic VS  10-22-24 @ 22:07  Lying BP: --/-- HR: --  Sitting BP: 122/83 HR: 79  Standing BP: 129/75 HR: 87  Site: --  Mode: --   Vital Signs Last 24 Hrs  T(C): 36.2 (10-25-24 @ 08:50), Max: 36.7 (10-24-24 @ 19:19)  T(F): 97.2 (10-25-24 @ 08:50), Max: 98 (10-24-24 @ 19:19)  HR: --  BP: --  BP(mean): --  RR: 16 (10-25-24 @ 08:50) (16 - 16)  SpO2: --    Orthostatic VS  10-25-24 @ 08:50  Lying BP: --/-- HR: --  Sitting BP: 120/77 HR: 82  Standing BP: 121/76 HR: 91  Site: upper left arm  Mode: electronic  Orthostatic VS  10-24-24 @ 19:19  Lying BP: --/-- HR: --  Sitting BP: 119/71 HR: 78  Standing BP: 115/69 HR: 92  Site: --  Mode: --  Orthostatic VS  10-24-24 @ 08:50  Lying BP: --/-- HR: --  Sitting BP: 107/63 HR: 83  Standing BP: 107/66 HR: 95  Site: upper left arm  Mode: electronic  Orthostatic VS  10-23-24 @ 19:06  Lying BP: --/-- HR: --  Sitting BP: 108/72 HR: 100  Standing BP: 109/73 HR: 106  Site: --  Mode: --

## 2024-10-24 NOTE — BH PSYCHOLOGY - GROUP THERAPY NOTE - NSPSYCHOLGRPCOGPT_PSY_A_CORE FT
Patient attended Cognitive Behavioral Therapy Group incorporating ACT-based concepts. The group started with a brief check-in asking Pts to share something they’ve been meaning to do that they keep putting off, or something that they would like to learn how to do. Group focused on engaging in a discussion about dialectical thinking; exploring opposites that can both be true (ex. wanting support and being independent, being mad at others and loving/respecting them, being with others while also feeling lonely, sharing some things and keeping other things private); while normalizing/validating these experiences. Group facilitator explained concepts, reinforced participation, and engaged patients in the discussion.

## 2024-10-24 NOTE — BH INPATIENT PSYCHIATRY PROGRESS NOTE - PRN MEDS
MEDICATIONS  (PRN):  hydrOXYzine hydrochloride 50 milliGRAM(s) Oral every 6 hours PRN Anxiety  LORazepam     Tablet 2 milliGRAM(s) Oral every 6 hours PRN agitation secondary to mood destabilization  LORazepam   Injectable 2 milliGRAM(s) IntraMuscular Once PRN severe agitation secondary to mood destabilization   MEDICATIONS  (PRN):  acetaminophen     Tablet .. 650 milliGRAM(s) Oral every 6 hours PRN Mild Pain (1 - 3), Moderate Pain (4 - 6)  hydrOXYzine hydrochloride 50 milliGRAM(s) Oral every 6 hours PRN Anxiety  LORazepam     Tablet 2 milliGRAM(s) Oral every 6 hours PRN agitation secondary to mood destabilization  LORazepam   Injectable 2 milliGRAM(s) IntraMuscular Once PRN severe agitation secondary to mood destabilization

## 2024-10-24 NOTE — BH INPATIENT PSYCHIATRY PROGRESS NOTE - CURRENT MEDICATION
MEDICATIONS  (STANDING):  FLUoxetine 60 milliGRAM(s) Oral daily  hydrOXYzine hydrochloride 50 milliGRAM(s) Oral daily  melatonin. 3 milliGRAM(s) Oral at bedtime    MEDICATIONS  (PRN):  hydrOXYzine hydrochloride 50 milliGRAM(s) Oral every 6 hours PRN Anxiety  LORazepam     Tablet 2 milliGRAM(s) Oral every 6 hours PRN agitation secondary to mood destabilization  LORazepam   Injectable 2 milliGRAM(s) IntraMuscular Once PRN severe agitation secondary to mood destabilization   MEDICATIONS  (STANDING):  ARIPiprazole 15 milliGRAM(s) Oral daily  FLUoxetine 60 milliGRAM(s) Oral daily  hydrOXYzine hydrochloride 50 milliGRAM(s) Oral daily  melatonin. 3 milliGRAM(s) Oral at bedtime    MEDICATIONS  (PRN):  acetaminophen     Tablet .. 650 milliGRAM(s) Oral every 6 hours PRN Mild Pain (1 - 3), Moderate Pain (4 - 6)  hydrOXYzine hydrochloride 50 milliGRAM(s) Oral every 6 hours PRN Anxiety  LORazepam     Tablet 2 milliGRAM(s) Oral every 6 hours PRN agitation secondary to mood destabilization  LORazepam   Injectable 2 milliGRAM(s) IntraMuscular Once PRN severe agitation secondary to mood destabilization

## 2024-10-24 NOTE — BH INPATIENT PSYCHIATRY PROGRESS NOTE - NSBHMETABOLIC_PSY_ALL_CORE_FT
BMI: BMI (kg/m2): 18.7 (10-22-24 @ 22:07)  HbA1c: A1C with Estimated Average Glucose Result: 4.9 % (10-23-24 @ 10:12)    Glucose:   BP: 115/76 (10-22-24 @ 21:20) (115/76 - 120/79)Vital Signs Last 24 Hrs  T(C): 36.3 (10-24-24 @ 08:50), Max: 36.6 (10-23-24 @ 19:06)  T(F): 97.3 (10-24-24 @ 08:50), Max: 97.9 (10-23-24 @ 19:06)  HR: --  BP: --  BP(mean): --  RR: --  SpO2: --    Orthostatic VS  10-24-24 @ 08:50  Lying BP: --/-- HR: --  Sitting BP: 107/63 HR: 83  Standing BP: 107/66 HR: 95  Site: upper left arm  Mode: electronic  Orthostatic VS  10-23-24 @ 19:06  Lying BP: --/-- HR: --  Sitting BP: 108/72 HR: 100  Standing BP: 109/73 HR: 106  Site: --  Mode: --  Orthostatic VS  10-23-24 @ 08:28  Lying BP: --/-- HR: --  Sitting BP: 112/64 HR: 92  Standing BP: 112/72 HR: 82  Site: --  Mode: --  Orthostatic VS  10-22-24 @ 22:07  Lying BP: --/-- HR: --  Sitting BP: 122/83 HR: 79  Standing BP: 129/75 HR: 87  Site: --  Mode: --    Lipid Panel: Date/Time: 10-23-24 @ 10:12  Cholesterol, Serum: 195  LDL Cholesterol Calculated: 127  HDL Cholesterol, Serum: 50  Total Cholesterol/HDL Ration Measurement: --  Triglycerides, Serum: 92   BMI: BMI (kg/m2): 18.7 (10-22-24 @ 22:07)  HbA1c: A1C with Estimated Average Glucose Result: 4.9 % (10-23-24 @ 10:12)    Glucose:   BP: 115/76 (10-22-24 @ 21:20) (115/76 - 120/79)Vital Signs Last 24 Hrs  T(C): 36.2 (10-25-24 @ 08:50), Max: 36.7 (10-24-24 @ 19:19)  T(F): 97.2 (10-25-24 @ 08:50), Max: 98 (10-24-24 @ 19:19)  HR: --  BP: --  BP(mean): --  RR: 16 (10-25-24 @ 08:50) (16 - 16)  SpO2: --    Orthostatic VS  10-25-24 @ 08:50  Lying BP: --/-- HR: --  Sitting BP: 120/77 HR: 82  Standing BP: 121/76 HR: 91  Site: upper left arm  Mode: electronic  Orthostatic VS  10-24-24 @ 19:19  Lying BP: --/-- HR: --  Sitting BP: 119/71 HR: 78  Standing BP: 115/69 HR: 92  Site: --  Mode: --  Orthostatic VS  10-24-24 @ 08:50  Lying BP: --/-- HR: --  Sitting BP: 107/63 HR: 83  Standing BP: 107/66 HR: 95  Site: upper left arm  Mode: electronic  Orthostatic VS  10-23-24 @ 19:06  Lying BP: --/-- HR: --  Sitting BP: 108/72 HR: 100  Standing BP: 109/73 HR: 106  Site: --  Mode: --    Lipid Panel: Date/Time: 10-23-24 @ 10:12  Cholesterol, Serum: 195  LDL Cholesterol Calculated: 127  HDL Cholesterol, Serum: 50  Total Cholesterol/HDL Ration Measurement: --  Triglycerides, Serum: 92

## 2024-10-24 NOTE — BH PSYCHOLOGY - GROUP THERAPY NOTE - NSBHPSYCHOLRESPCOMMENT_PSY_A_CORE FT
Pt appeared appropriately groomed and casually dressed. Pt appeared fully engaged in the group as evidenced by her willingness to verbally communicate with prompting during check-in and discussion. Pt reflected on frustrating qualities in friends, citing that she can come to a place of accepting while also wanting them to change. Pt also noted what makes it difficult to ask others for help, citing not wanting to “burden them.” Speech was WNL. PT was oriented X3. Pt was appropriate with peers.

## 2024-10-25 PROCEDURE — 99232 SBSQ HOSP IP/OBS MODERATE 35: CPT

## 2024-10-25 RX ADMIN — ARIPIPRAZOLE 15 MILLIGRAM(S): 2 TABLET ORAL at 08:02

## 2024-10-25 RX ADMIN — Medication 60 MILLIGRAM(S): at 08:02

## 2024-10-25 RX ADMIN — Medication 50 MILLIGRAM(S): at 08:02

## 2024-10-25 RX ADMIN — Medication 2 MILLIGRAM(S): at 21:04

## 2024-10-25 RX ADMIN — Medication 3 MILLIGRAM(S): at 21:04

## 2024-10-25 NOTE — BH INPATIENT PSYCHIATRY PROGRESS NOTE - PRN MEDS
MEDICATIONS  (PRN):  acetaminophen     Tablet .. 650 milliGRAM(s) Oral every 6 hours PRN Mild Pain (1 - 3), Moderate Pain (4 - 6)  hydrOXYzine hydrochloride 50 milliGRAM(s) Oral every 6 hours PRN Anxiety  LORazepam     Tablet 2 milliGRAM(s) Oral every 6 hours PRN agitation secondary to mood destabilization  LORazepam   Injectable 2 milliGRAM(s) IntraMuscular Once PRN severe agitation secondary to mood destabilization

## 2024-10-25 NOTE — BH INPATIENT PSYCHIATRY PROGRESS NOTE - NSBHCHARTREVIEWVS_PSY_A_CORE FT
Vital Signs Last 24 Hrs  T(C): 36.2 (10-25-24 @ 08:50), Max: 36.7 (10-24-24 @ 19:19)  T(F): 97.2 (10-25-24 @ 08:50), Max: 98 (10-24-24 @ 19:19)  HR: --  BP: --  BP(mean): --  RR: 16 (10-25-24 @ 08:50) (16 - 16)  SpO2: --    Orthostatic VS  10-25-24 @ 08:50  Lying BP: --/-- HR: --  Sitting BP: 120/77 HR: 82  Standing BP: 121/76 HR: 91  Site: upper left arm  Mode: electronic  Orthostatic VS  10-24-24 @ 19:19  Lying BP: --/-- HR: --  Sitting BP: 119/71 HR: 78  Standing BP: 115/69 HR: 92  Site: --  Mode: --  Orthostatic VS  10-24-24 @ 08:50  Lying BP: --/-- HR: --  Sitting BP: 107/63 HR: 83  Standing BP: 107/66 HR: 95  Site: upper left arm  Mode: electronic  Orthostatic VS  10-23-24 @ 19:06  Lying BP: --/-- HR: --  Sitting BP: 108/72 HR: 100  Standing BP: 109/73 HR: 106  Site: --  Mode: --   Vital Signs Last 24 Hrs  T(C): 36.6 (10-28-24 @ 08:52), Max: 36.7 (10-27-24 @ 20:01)  T(F): 97.8 (10-28-24 @ 08:52), Max: 98.1 (10-27-24 @ 20:01)  HR: --  BP: --  BP(mean): --  RR: 17 (10-27-24 @ 12:09) (17 - 17)  SpO2: --    Orthostatic VS  10-28-24 @ 08:52  Lying BP: --/-- HR: --  Sitting BP: 105/70 HR: 107  Standing BP: 122/63 HR: 103  Site: --  Mode: --  Orthostatic VS  10-27-24 @ 20:01  Lying BP: --/-- HR: --  Sitting BP: 122/72 HR: 97  Standing BP: 115/68 HR: 101  Site: --  Mode: --  Orthostatic VS  10-27-24 @ 08:51  Lying BP: --/-- HR: --  Sitting BP: 112/74 HR: 89  Standing BP: 115/72 HR: 92  Site: --  Mode: electronic  Orthostatic VS  10-26-24 @ 19:45  Lying BP: --/-- HR: --  Sitting BP: 120/74 HR: 85  Standing BP: 116/68 HR: 101  Site: --  Mode: --
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
## 2024-10-25 NOTE — BH INPATIENT PSYCHIATRY PROGRESS NOTE - NSBHMETABOLIC_PSY_ALL_CORE_FT
BMI: BMI (kg/m2): 18.7 (10-22-24 @ 22:07)  HbA1c: A1C with Estimated Average Glucose Result: 4.9 % (10-23-24 @ 10:12)    Glucose:   BP: 115/76 (10-22-24 @ 21:20) (115/76 - 120/79)Vital Signs Last 24 Hrs  T(C): 36.2 (10-25-24 @ 08:50), Max: 36.7 (10-24-24 @ 19:19)  T(F): 97.2 (10-25-24 @ 08:50), Max: 98 (10-24-24 @ 19:19)  HR: --  BP: --  BP(mean): --  RR: 16 (10-25-24 @ 08:50) (16 - 16)  SpO2: --    Orthostatic VS  10-25-24 @ 08:50  Lying BP: --/-- HR: --  Sitting BP: 120/77 HR: 82  Standing BP: 121/76 HR: 91  Site: upper left arm  Mode: electronic  Orthostatic VS  10-24-24 @ 19:19  Lying BP: --/-- HR: --  Sitting BP: 119/71 HR: 78  Standing BP: 115/69 HR: 92  Site: --  Mode: --  Orthostatic VS  10-24-24 @ 08:50  Lying BP: --/-- HR: --  Sitting BP: 107/63 HR: 83  Standing BP: 107/66 HR: 95  Site: upper left arm  Mode: electronic  Orthostatic VS  10-23-24 @ 19:06  Lying BP: --/-- HR: --  Sitting BP: 108/72 HR: 100  Standing BP: 109/73 HR: 106  Site: --  Mode: --    Lipid Panel: Date/Time: 10-23-24 @ 10:12  Cholesterol, Serum: 195  LDL Cholesterol Calculated: 127  HDL Cholesterol, Serum: 50  Total Cholesterol/HDL Ration Measurement: --  Triglycerides, Serum: 92   BMI: BMI (kg/m2): 18.7 (10-22-24 @ 22:07)  HbA1c: A1C with Estimated Average Glucose Result: 4.9 % (10-23-24 @ 10:12)    Glucose:   BP: --Vital Signs Last 24 Hrs  T(C): 36.6 (10-28-24 @ 08:52), Max: 36.7 (10-27-24 @ 20:01)  T(F): 97.8 (10-28-24 @ 08:52), Max: 98.1 (10-27-24 @ 20:01)  HR: --  BP: --  BP(mean): --  RR: 17 (10-27-24 @ 12:09) (17 - 17)  SpO2: --    Orthostatic VS  10-28-24 @ 08:52  Lying BP: --/-- HR: --  Sitting BP: 105/70 HR: 107  Standing BP: 122/63 HR: 103  Site: --  Mode: --  Orthostatic VS  10-27-24 @ 20:01  Lying BP: --/-- HR: --  Sitting BP: 122/72 HR: 97  Standing BP: 115/68 HR: 101  Site: --  Mode: --  Orthostatic VS  10-27-24 @ 08:51  Lying BP: --/-- HR: --  Sitting BP: 112/74 HR: 89  Standing BP: 115/72 HR: 92  Site: --  Mode: electronic  Orthostatic VS  10-26-24 @ 19:45  Lying BP: --/-- HR: --  Sitting BP: 120/74 HR: 85  Standing BP: 116/68 HR: 101  Site: --  Mode: --    Lipid Panel: Date/Time: 10-23-24 @ 10:12  Cholesterol, Serum: 195  LDL Cholesterol Calculated: 127  HDL Cholesterol, Serum: 50  Total Cholesterol/HDL Ration Measurement: --  Triglycerides, Serum: 92

## 2024-10-25 NOTE — BH INPATIENT PSYCHIATRY PROGRESS NOTE - NSBHFUPINTERVALHXFT_PSY_A_CORE
Chart and history reviewed and discussed with treatment team. No events reported overnight. Seen with SW. Pt does not present with overt signs of depression, though states her mood is "the same" No behavioral issues noted. Medication compliant, tolerating well, free of EPS. Sleep and appetite maintained. Continue to monitor for safety.  Chart and history reviewed and discussed with treatment team. No events reported overnight. Seen with SW. Pt does not present with overt signs of depression, though states her mood is "the same". Pt seen smiling on the unit, social with others, interacting appropriately. Pt reports that she still hears the demon voice intermittently, last heard last night and this morning. Pt seen in group therapy. No behavioral issues noted. Medication compliant, tolerating well, free of EPS. Sleep and appetite maintained. Continue to monitor for safety.

## 2024-10-25 NOTE — BH INPATIENT PSYCHIATRY PROGRESS NOTE - CURRENT MEDICATION
MEDICATIONS  (STANDING):  ARIPiprazole 15 milliGRAM(s) Oral daily  FLUoxetine 60 milliGRAM(s) Oral daily  hydrOXYzine hydrochloride 50 milliGRAM(s) Oral daily  melatonin. 3 milliGRAM(s) Oral at bedtime    MEDICATIONS  (PRN):  acetaminophen     Tablet .. 650 milliGRAM(s) Oral every 6 hours PRN Mild Pain (1 - 3), Moderate Pain (4 - 6)  hydrOXYzine hydrochloride 50 milliGRAM(s) Oral every 6 hours PRN Anxiety  LORazepam     Tablet 2 milliGRAM(s) Oral every 6 hours PRN agitation secondary to mood destabilization  LORazepam   Injectable 2 milliGRAM(s) IntraMuscular Once PRN severe agitation secondary to mood destabilization   MEDICATIONS  (STANDING):  ARIPiprazole 20 milliGRAM(s) Oral daily  FLUoxetine 60 milliGRAM(s) Oral daily  hydrOXYzine hydrochloride 50 milliGRAM(s) Oral daily  melatonin. 3 milliGRAM(s) Oral at bedtime    MEDICATIONS  (PRN):  acetaminophen     Tablet .. 650 milliGRAM(s) Oral every 6 hours PRN Mild Pain (1 - 3), Moderate Pain (4 - 6)  hydrOXYzine hydrochloride 50 milliGRAM(s) Oral every 6 hours PRN Anxiety  LORazepam     Tablet 2 milliGRAM(s) Oral every 6 hours PRN agitation secondary to mood destabilization  LORazepam   Injectable 2 milliGRAM(s) IntraMuscular Once PRN severe agitation secondary to mood destabilization

## 2024-10-26 PROCEDURE — 99232 SBSQ HOSP IP/OBS MODERATE 35: CPT

## 2024-10-26 RX ADMIN — ARIPIPRAZOLE 15 MILLIGRAM(S): 2 TABLET ORAL at 08:38

## 2024-10-26 RX ADMIN — Medication 2 MILLIGRAM(S): at 20:30

## 2024-10-26 RX ADMIN — Medication 50 MILLIGRAM(S): at 08:39

## 2024-10-26 RX ADMIN — Medication 3 MILLIGRAM(S): at 20:30

## 2024-10-26 RX ADMIN — Medication 50 MILLIGRAM(S): at 20:30

## 2024-10-26 RX ADMIN — Medication 60 MILLIGRAM(S): at 08:38

## 2024-10-26 NOTE — BH CHART NOTE - NSEVENTNOTEFT_PSY_ALL_CORE
HANNAH called to perform psychiatric safety assessment on patient expressing suicidality. Patient reports that two nights ago, she attempted to suffocate herself with a pillow, but did not follow through because "my body didn't let me." Also reports intrusive thought yesterday of hitting head on the wall of the day room, but did not act on thoughts. Patient currently denies specific plan or intent. Patient is able to identify the protective factors including her 6-year-old younger sister. She reports coping skills of calling home and talking to friends on the unit. Pt states suicidality is triggered by poor sleep and feeling unhappy with current unit placement. Patient agrees to come to staff with any continued or worsening suicidality and is able to safety plan. At this time patient does not require CO 1:1 to remain safe. Discussed with RN staff to notify HANNAH with any worsening of symptoms. Pt is requesting books from personal items to help with coping/distraction. Discussed with staff, okay for patient to have 1 book.
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
## 2024-10-26 NOTE — BH INPATIENT PSYCHIATRY PROGRESS NOTE - NSBHCHARTREVIEWVS_PSY_A_CORE FT
Vital Signs Last 24 Hrs  T(C): 36.9 (10-26-24 @ 08:55), Max: 36.9 (10-26-24 @ 08:55)  T(F): 98.5 (10-26-24 @ 08:55), Max: 98.5 (10-26-24 @ 08:55)  HR: --  BP: --  BP(mean): --  RR: --  SpO2: --    Orthostatic VS  10-26-24 @ 08:55  Lying BP: --/-- HR: --  Sitting BP: 123/83 HR: 89  Standing BP: 118/76 HR: 74  Site: --  Mode: --  Orthostatic VS  10-25-24 @ 19:33  Lying BP: --/-- HR: --  Sitting BP: 111/62 HR: 88  Standing BP: 104/65 HR: 99  Site: --  Mode: --  Orthostatic VS  10-25-24 @ 08:50  Lying BP: --/-- HR: --  Sitting BP: 120/77 HR: 82  Standing BP: 121/76 HR: 91  Site: upper left arm  Mode: electronic  Orthostatic VS  10-24-24 @ 19:19  Lying BP: --/-- HR: --  Sitting BP: 119/71 HR: 78  Standing BP: 115/69 HR: 92  Site: --  Mode: --

## 2024-10-26 NOTE — BH INPATIENT PSYCHIATRY PROGRESS NOTE - NSBHMETABOLIC_PSY_ALL_CORE_FT
BMI: BMI (kg/m2): 18.7 (10-22-24 @ 22:07)  HbA1c: A1C with Estimated Average Glucose Result: 4.9 % (10-23-24 @ 10:12)    Glucose:   BP: --Vital Signs Last 24 Hrs  T(C): 36.9 (10-26-24 @ 08:55), Max: 36.9 (10-26-24 @ 08:55)  T(F): 98.5 (10-26-24 @ 08:55), Max: 98.5 (10-26-24 @ 08:55)  HR: --  BP: --  BP(mean): --  RR: --  SpO2: --    Orthostatic VS  10-26-24 @ 08:55  Lying BP: --/-- HR: --  Sitting BP: 123/83 HR: 89  Standing BP: 118/76 HR: 74  Site: --  Mode: --  Orthostatic VS  10-25-24 @ 19:33  Lying BP: --/-- HR: --  Sitting BP: 111/62 HR: 88  Standing BP: 104/65 HR: 99  Site: --  Mode: --  Orthostatic VS  10-25-24 @ 08:50  Lying BP: --/-- HR: --  Sitting BP: 120/77 HR: 82  Standing BP: 121/76 HR: 91  Site: upper left arm  Mode: electronic  Orthostatic VS  10-24-24 @ 19:19  Lying BP: --/-- HR: --  Sitting BP: 119/71 HR: 78  Standing BP: 115/69 HR: 92  Site: --  Mode: --    Lipid Panel: Date/Time: 10-23-24 @ 10:12  Cholesterol, Serum: 195  LDL Cholesterol Calculated: 127  HDL Cholesterol, Serum: 50  Total Cholesterol/HDL Ration Measurement: --  Triglycerides, Serum: 92
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
## 2024-10-26 NOTE — BH INPATIENT PSYCHIATRY PROGRESS NOTE - CURRENT MEDICATION
MEDICATIONS  (STANDING):  ARIPiprazole 15 milliGRAM(s) Oral daily  FLUoxetine 60 milliGRAM(s) Oral daily  hydrOXYzine hydrochloride 50 milliGRAM(s) Oral daily  melatonin. 3 milliGRAM(s) Oral at bedtime    MEDICATIONS  (PRN):  acetaminophen     Tablet .. 650 milliGRAM(s) Oral every 6 hours PRN Mild Pain (1 - 3), Moderate Pain (4 - 6)  hydrOXYzine hydrochloride 50 milliGRAM(s) Oral every 6 hours PRN Anxiety  LORazepam     Tablet 2 milliGRAM(s) Oral every 6 hours PRN agitation secondary to mood destabilization  LORazepam   Injectable 2 milliGRAM(s) IntraMuscular Once PRN severe agitation secondary to mood destabilization

## 2024-10-27 PROCEDURE — 99232 SBSQ HOSP IP/OBS MODERATE 35: CPT

## 2024-10-27 RX ORDER — ARIPIPRAZOLE 2 MG/1
20 TABLET ORAL DAILY
Refills: 0 | Status: DISCONTINUED | OUTPATIENT
Start: 2024-10-28 | End: 2024-11-05

## 2024-10-27 RX ADMIN — Medication 3 MILLIGRAM(S): at 20:58

## 2024-10-27 RX ADMIN — Medication 50 MILLIGRAM(S): at 20:58

## 2024-10-27 RX ADMIN — Medication 50 MILLIGRAM(S): at 08:50

## 2024-10-27 RX ADMIN — ARIPIPRAZOLE 15 MILLIGRAM(S): 2 TABLET ORAL at 08:51

## 2024-10-27 RX ADMIN — Medication 60 MILLIGRAM(S): at 08:50

## 2024-10-27 RX ADMIN — Medication 2 MILLIGRAM(S): at 18:15
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
## 2024-10-27 NOTE — BH INPATIENT PSYCHIATRY PROGRESS NOTE - NSBHFUPINTERVALHXFT_PSY_A_CORE
Pt compliant with medication and tolerating it well.  Chart reviewed and case discussed with nursing.  No events reported overnight.  Pt reports she continues to feel depressed with SI no I/P.  Pt reports AH of "chatter" today and reports CAH to kill herself yesterday, but not today.  Pt denies VH today, but reports VH of figures yesterday.  Pt reports poor sleep last night and fair appetite.  Pt reports paranoia that someone is going to hurt or kill her.  Pt denies HI/I/P.

## 2024-10-27 NOTE — BH INPATIENT PSYCHIATRY PROGRESS NOTE - NSBHCHARTREVIEWVS_PSY_A_CORE FT
Vital Signs Last 24 Hrs  T(C): 36.8 (10-27-24 @ 08:51), Max: 36.8 (10-27-24 @ 08:51)  T(F): 98.3 (10-27-24 @ 08:51), Max: 98.3 (10-27-24 @ 08:51)  HR: --  BP: --  BP(mean): --  RR: 17 (10-26-24 @ 17:58) (17 - 17)  SpO2: --    Orthostatic VS  10-27-24 @ 08:51  Lying BP: --/-- HR: --  Sitting BP: 112/74 HR: 89  Standing BP: 115/72 HR: 92  Site: --  Mode: electronic  Orthostatic VS  10-26-24 @ 19:45  Lying BP: --/-- HR: --  Sitting BP: 120/74 HR: 85  Standing BP: 116/68 HR: 101  Site: --  Mode: --  Orthostatic VS  10-26-24 @ 08:55  Lying BP: --/-- HR: --  Sitting BP: 123/83 HR: 89  Standing BP: 118/76 HR: 74  Site: --  Mode: --  Orthostatic VS  10-25-24 @ 19:33  Lying BP: --/-- HR: --  Sitting BP: 111/62 HR: 88  Standing BP: 104/65 HR: 99  Site: --  Mode: --

## 2024-10-27 NOTE — BH INPATIENT PSYCHIATRY PROGRESS NOTE - CURRENT MEDICATION
MEDICATIONS  (STANDING):  FLUoxetine 60 milliGRAM(s) Oral daily  hydrOXYzine hydrochloride 50 milliGRAM(s) Oral daily  melatonin. 3 milliGRAM(s) Oral at bedtime    MEDICATIONS  (PRN):  acetaminophen     Tablet .. 650 milliGRAM(s) Oral every 6 hours PRN Mild Pain (1 - 3), Moderate Pain (4 - 6)  hydrOXYzine hydrochloride 50 milliGRAM(s) Oral every 6 hours PRN Anxiety  LORazepam     Tablet 2 milliGRAM(s) Oral every 6 hours PRN agitation secondary to mood destabilization  LORazepam   Injectable 2 milliGRAM(s) IntraMuscular Once PRN severe agitation secondary to mood destabilization

## 2024-10-27 NOTE — BH INPATIENT PSYCHIATRY PROGRESS NOTE - NSBHMETABOLIC_PSY_ALL_CORE_FT
BMI: BMI (kg/m2): 18.7 (10-22-24 @ 22:07)  HbA1c: A1C with Estimated Average Glucose Result: 4.9 % (10-23-24 @ 10:12)    Glucose:   BP: --Vital Signs Last 24 Hrs  T(C): 36.8 (10-27-24 @ 08:51), Max: 36.8 (10-27-24 @ 08:51)  T(F): 98.3 (10-27-24 @ 08:51), Max: 98.3 (10-27-24 @ 08:51)  HR: --  BP: --  BP(mean): --  RR: 17 (10-26-24 @ 17:58) (17 - 17)  SpO2: --    Orthostatic VS  10-27-24 @ 08:51  Lying BP: --/-- HR: --  Sitting BP: 112/74 HR: 89  Standing BP: 115/72 HR: 92  Site: --  Mode: electronic  Orthostatic VS  10-26-24 @ 19:45  Lying BP: --/-- HR: --  Sitting BP: 120/74 HR: 85  Standing BP: 116/68 HR: 101  Site: --  Mode: --  Orthostatic VS  10-26-24 @ 08:55  Lying BP: --/-- HR: --  Sitting BP: 123/83 HR: 89  Standing BP: 118/76 HR: 74  Site: --  Mode: --  Orthostatic VS  10-25-24 @ 19:33  Lying BP: --/-- HR: --  Sitting BP: 111/62 HR: 88  Standing BP: 104/65 HR: 99  Site: --  Mode: --    Lipid Panel: Date/Time: 10-23-24 @ 10:12  Cholesterol, Serum: 195  LDL Cholesterol Calculated: 127  HDL Cholesterol, Serum: 50  Total Cholesterol/HDL Ration Measurement: --  Triglycerides, Serum: 92

## 2024-10-28 PROCEDURE — 99232 SBSQ HOSP IP/OBS MODERATE 35: CPT

## 2024-10-28 RX ADMIN — Medication 3 MILLIGRAM(S): at 20:49

## 2024-10-28 RX ADMIN — Medication 60 MILLIGRAM(S): at 08:06

## 2024-10-28 RX ADMIN — ARIPIPRAZOLE 20 MILLIGRAM(S): 2 TABLET ORAL at 08:05

## 2024-10-28 RX ADMIN — Medication 2 MILLIGRAM(S): at 20:49

## 2024-10-28 RX ADMIN — Medication 50 MILLIGRAM(S): at 08:06

## 2024-10-28 RX ADMIN — Medication 2 MILLIGRAM(S): at 10:54

## 2024-10-28 NOTE — BH INPATIENT PSYCHIATRY PROGRESS NOTE - NSBHFUPINTERVALHXFT_PSY_A_CORE
Chart and history reviewed and discussed with treatment team. No events reported overnight. Event note from weekend reviewed. Pt seen resting in bed, reporting continued depressive symptoms. Pt states that she attempted to suffocate herself with a pillow earlier this morning as well as attempted to strangle herself this morning with her hands. Pt reports SI with plan to hang herself with the shower curtain. Pt placed on CO for SI. Abilify recently increased to 20mg, first dose received this morning, with pt reporting continued AH of a "demon." Medication compliant, tolerating well, free of EPS. Sleep and appetite maintained. Continue to monitor for safety.     NP spoke with pt's mother, Deanne, to provide update regarding need for CO. Mom reported that pt had told her this morning that she felt "better" but still wishes to transfer to college unit.  Chart and history reviewed and discussed with treatment team. No events reported overnight. Event note from weekend reviewed. Events discussed with pt, who reiterated that she did not attempt to harm herself over the weekend or prior and that when she last spoke to the treatment team on Friday, she felt safe on the unit. Today, pt seen resting in bed, reporting continued depressive symptoms. Pt states that she attempted to suffocate herself with a pillow earlier this morning as well as attempted to strangle herself this morning with her hands. Pt reports SI with plan to hang herself with the shower curtain. Pt placed on CO for SI. Pt states that thoughts of suicide "just happen." Abilify recently increased to 20mg, first dose received this morning, with pt reporting continued AH of a "demon." Plan to discuss addition of lithium as adjunct for SI. Medication compliant, tolerating well, free of EPS. Sleep and appetite maintained. Continue to monitor for safety.     NP spoke with pt's mother, Deanne, to provide update regarding need for CO. Mom reported that pt had told her this morning that she felt "better" but still wishes to transfer to college unit.  Chart and history reviewed and discussed with treatment team. No events reported overnight. Event note from weekend reviewed. Events discussed with pt, who reiterated that she did not attempt to harm herself over the weekend or prior and that when she last spoke to the treatment team on Friday, she felt safe on the unit. Today, pt seen resting in bed, reporting continued depressive symptoms. Pt states that she attempted to suffocate herself with a pillow earlier this morning as well as attempted to strangle herself this morning with her hands. Pt reports SI with plan to hang herself with the shower curtain. Pt placed on CO for SI. Pt states that negative thoughts "just happen." Abilify recently increased to 20mg, first dose received this morning, with pt reporting continued AH of a "demon." Plan to discuss addition of lithium as adjunct for SI. Medication compliant, tolerating well, free of EPS. Sleep and appetite maintained. Continue to monitor for safety.     NP spoke with pt's mother, Deanne, to provide update regarding need for CO. Mom reported that pt had told her this morning that she felt "better" but still wishes to transfer to college unit.     NP left message with pt's outpatient treatment team for collateral, awaiting call back.

## 2024-10-28 NOTE — BH INPATIENT PSYCHIATRY PROGRESS NOTE - NSBHCHARTREVIEWVS_PSY_A_CORE FT
Vital Signs Last 24 Hrs  T(C): 36.6 (10-28-24 @ 08:52), Max: 36.7 (10-27-24 @ 20:01)  T(F): 97.8 (10-28-24 @ 08:52), Max: 98.1 (10-27-24 @ 20:01)  HR: --  BP: --  BP(mean): --  RR: 18 (10-28-24 @ 08:52) (18 - 18)  SpO2: --    Orthostatic VS  10-28-24 @ 08:52  Lying BP: --/-- HR: --  Sitting BP: 105/70 HR: 107  Standing BP: 122/63 HR: 103  Site: --  Mode: --  Orthostatic VS  10-27-24 @ 20:01  Lying BP: --/-- HR: --  Sitting BP: 122/72 HR: 97  Standing BP: 115/68 HR: 101  Site: --  Mode: --  Orthostatic VS  10-27-24 @ 08:51  Lying BP: --/-- HR: --  Sitting BP: 112/74 HR: 89  Standing BP: 115/72 HR: 92  Site: --  Mode: electronic  Orthostatic VS  10-26-24 @ 19:45  Lying BP: --/-- HR: --  Sitting BP: 120/74 HR: 85  Standing BP: 116/68 HR: 101  Site: --  Mode: --

## 2024-10-28 NOTE — BH INPATIENT PSYCHIATRY PROGRESS NOTE - CURRENT MEDICATION
MEDICATIONS  (STANDING):  ARIPiprazole 20 milliGRAM(s) Oral daily  FLUoxetine 60 milliGRAM(s) Oral daily  hydrOXYzine hydrochloride 50 milliGRAM(s) Oral daily  melatonin. 3 milliGRAM(s) Oral at bedtime    MEDICATIONS  (PRN):  acetaminophen     Tablet .. 650 milliGRAM(s) Oral every 6 hours PRN Mild Pain (1 - 3), Moderate Pain (4 - 6)  hydrOXYzine hydrochloride 50 milliGRAM(s) Oral every 6 hours PRN Anxiety  LORazepam     Tablet 2 milliGRAM(s) Oral every 6 hours PRN agitation secondary to mood destabilization  LORazepam   Injectable 2 milliGRAM(s) IntraMuscular Once PRN severe agitation secondary to mood destabilization
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
## 2024-10-28 NOTE — BH INPATIENT PSYCHIATRY PROGRESS NOTE - NSBHMETABOLIC_PSY_ALL_CORE_FT
BMI: BMI (kg/m2): 18.7 (10-22-24 @ 22:07)  HbA1c: A1C with Estimated Average Glucose Result: 4.9 % (10-23-24 @ 10:12)    Glucose:   BP: --Vital Signs Last 24 Hrs  T(C): 36.6 (10-28-24 @ 08:52), Max: 36.7 (10-27-24 @ 20:01)  T(F): 97.8 (10-28-24 @ 08:52), Max: 98.1 (10-27-24 @ 20:01)  HR: --  BP: --  BP(mean): --  RR: 18 (10-28-24 @ 08:52) (18 - 18)  SpO2: --    Orthostatic VS  10-28-24 @ 08:52  Lying BP: --/-- HR: --  Sitting BP: 105/70 HR: 107  Standing BP: 122/63 HR: 103  Site: --  Mode: --  Orthostatic VS  10-27-24 @ 20:01  Lying BP: --/-- HR: --  Sitting BP: 122/72 HR: 97  Standing BP: 115/68 HR: 101  Site: --  Mode: --  Orthostatic VS  10-27-24 @ 08:51  Lying BP: --/-- HR: --  Sitting BP: 112/74 HR: 89  Standing BP: 115/72 HR: 92  Site: --  Mode: electronic  Orthostatic VS  10-26-24 @ 19:45  Lying BP: --/-- HR: --  Sitting BP: 120/74 HR: 85  Standing BP: 116/68 HR: 101  Site: --  Mode: --    Lipid Panel: Date/Time: 10-23-24 @ 10:12  Cholesterol, Serum: 195  LDL Cholesterol Calculated: 127  HDL Cholesterol, Serum: 50  Total Cholesterol/HDL Ration Measurement: --  Triglycerides, Serum: 92

## 2024-10-29 PROCEDURE — 99232 SBSQ HOSP IP/OBS MODERATE 35: CPT

## 2024-10-29 PROCEDURE — 90832 PSYTX W PT 30 MINUTES: CPT

## 2024-10-29 RX ADMIN — ARIPIPRAZOLE 20 MILLIGRAM(S): 2 TABLET ORAL at 08:22

## 2024-10-29 RX ADMIN — Medication 50 MILLIGRAM(S): at 08:22

## 2024-10-29 RX ADMIN — Medication 60 MILLIGRAM(S): at 08:22

## 2024-10-29 RX ADMIN — Medication 3 MILLIGRAM(S): at 21:24

## 2024-10-29 NOTE — BH INPATIENT PSYCHIATRY PROGRESS NOTE - NSBHCHARTREVIEWVS_PSY_A_CORE FT
Vital Signs Last 24 Hrs  T(C): 36.8 (10-29-24 @ 08:39), Max: 36.8 (10-29-24 @ 08:39)  T(F): 98.2 (10-29-24 @ 08:39), Max: 98.2 (10-29-24 @ 08:39)  HR: --  BP: --  BP(mean): --  RR: 17 (10-29-24 @ 08:39) (17 - 17)  SpO2: --    Orthostatic VS  10-29-24 @ 08:39  Lying BP: --/-- HR: --  Sitting BP: 118/75 HR: 92  Standing BP: 115/79 HR: 102  Site: --  Mode: --  Orthostatic VS  10-28-24 @ 19:41  Lying BP: --/-- HR: --  Sitting BP: 110/71 HR: 90  Standing BP: 128/75 HR: 100  Site: --  Mode: --  Orthostatic VS  10-28-24 @ 08:52  Lying BP: --/-- HR: --  Sitting BP: 105/70 HR: 107  Standing BP: 122/63 HR: 103  Site: --  Mode: --  Orthostatic VS  10-27-24 @ 20:01  Lying BP: --/-- HR: --  Sitting BP: 122/72 HR: 97  Standing BP: 115/68 HR: 101  Site: --  Mode: --

## 2024-10-29 NOTE — BH INPATIENT PSYCHIATRY PROGRESS NOTE - NSBHFUPINTERVALHXFT_PSY_A_CORE
Chart and history reviewed and discussed with treatment team. No events reported overnight. Pt seen resting in room, reporting that her mood continues to be "depressed" and that she feels "numb." Pt reports that she currently does not feel suicidal at this time and also denies +AH. Pt made aware of possibility to transfer to , with pt in agreement. Medication compliant, tolerating well, free of EPS. Sleep and appetite maintained. Remains on CO for SI. Continue to monitor for safety.    NP spoke with pt's mom to inform her of the transfer.      NP provided handoff to  via secure email.  Chart and history reviewed and discussed with treatment team. No events reported overnight. Pt seen resting in room, reporting that her mood continues to be "depressed" and that she feels "numb." Pt reports that she currently does not feel suicidal at this time and also denies +AH. Pt made aware of possibility to transfer to , with pt in agreement. Medication compliant, tolerating well, free of EPS. Sleep and appetite maintained. Remains on CO for SI. Continue to monitor for safety.    NP spoke with pt's mom to inform her of the transfer. Mom states that pt is suppose to be receiving B12 injections weekly, last given 10/17. B12 drawn on 10/24, therapeutic at 528pg/mL.    NP provided handoff to  via secure email.

## 2024-10-29 NOTE — BH INPATIENT PSYCHIATRY PROGRESS NOTE - NSBHMETABOLIC_PSY_ALL_CORE_FT
BMI: BMI (kg/m2): 18.7 (10-22-24 @ 22:07)  HbA1c: A1C with Estimated Average Glucose Result: 4.9 % (10-23-24 @ 10:12)    Glucose:   BP: --Vital Signs Last 24 Hrs  T(C): 36.8 (10-29-24 @ 08:39), Max: 36.8 (10-29-24 @ 08:39)  T(F): 98.2 (10-29-24 @ 08:39), Max: 98.2 (10-29-24 @ 08:39)  HR: --  BP: --  BP(mean): --  RR: 17 (10-29-24 @ 08:39) (17 - 17)  SpO2: --    Orthostatic VS  10-29-24 @ 08:39  Lying BP: --/-- HR: --  Sitting BP: 118/75 HR: 92  Standing BP: 115/79 HR: 102  Site: --  Mode: --  Orthostatic VS  10-28-24 @ 19:41  Lying BP: --/-- HR: --  Sitting BP: 110/71 HR: 90  Standing BP: 128/75 HR: 100  Site: --  Mode: --  Orthostatic VS  10-28-24 @ 08:52  Lying BP: --/-- HR: --  Sitting BP: 105/70 HR: 107  Standing BP: 122/63 HR: 103  Site: --  Mode: --  Orthostatic VS  10-27-24 @ 20:01  Lying BP: --/-- HR: --  Sitting BP: 122/72 HR: 97  Standing BP: 115/68 HR: 101  Site: --  Mode: --    Lipid Panel: Date/Time: 10-23-24 @ 10:12  Cholesterol, Serum: 195  LDL Cholesterol Calculated: 127  HDL Cholesterol, Serum: 50  Total Cholesterol/HDL Ration Measurement: --  Triglycerides, Serum: 92
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
## 2024-10-29 NOTE — BH INPATIENT PSYCHIATRY PROGRESS NOTE - ATTENDING COMMENTS
Chart was reviewed and case discussed with the Psych NP. I agree with the assessment and plan as documented in the Psych NP's progress note and was directly involved in medical decision making.  Over the weekend, pt reported to RN that she previously tried to suffocate self with pillows earlier last week. This is conflicting with prior in person interviews from treatment team in which pt reported passive SI thoughts with vague plans to suffocate self without intent. Upon further clarification by the treatment team today, it was determined that pt made provocative statements, pt acknowledges that she did not actually suffocate self, but only had the thoughts and did not act on this.   Later today, pt reports increasing SI with plans to strangle self with hands or use 2 pillows, and given the escalation of SI with plans and intent, pt placed on CO 1:1 for SI. 
Chart was reviewed and case discussed with the Psych NP. I agree with the history, examination, assessment and plan as documented in the Psych NP's assessment note and was directly involved in medical decision making.  
Chart was reviewed and case discussed with the Psych NP. I agree with the history, examination, assessment and plan as documented in the Psych NP's assessment note and was directly involved in medical decision making.  
Chart was reviewed and case discussed with the Psych NP. I agree with the assessment and plan as documented in the Psych NP's progress note and was directly involved in medical decision making.  Over the weekend, pt reported to RN that she previously tried to suffocate self with pillows earlier last week. This is conflicting with prior in person interviews from treatment team in which pt reported passive SI thoughts with vague plans to suffocate self without intent. Upon further clarification by the treatment team today, it was determined that pt made provocative statements, pt acknowledges that she did not actually suffocate self, but only had the thoughts and did not act on this.   Later today, pt reports increasing SI with plans to strangle self with hands or use 2 pillows, and given the escalation of SI with plans and intent, pt placed on CO 1:1 for SI.

## 2024-10-30 PROCEDURE — 90853 GROUP PSYCHOTHERAPY: CPT

## 2024-10-30 PROCEDURE — 99223 1ST HOSP IP/OBS HIGH 75: CPT

## 2024-10-30 RX ORDER — PRAZOSIN HCL 1 MG
1 CAPSULE ORAL AT BEDTIME
Refills: 0 | Status: DISCONTINUED | OUTPATIENT
Start: 2024-10-30 | End: 2024-11-04

## 2024-10-30 RX ADMIN — Medication 3 MILLIGRAM(S): at 20:14

## 2024-10-30 RX ADMIN — Medication 1 MILLIGRAM(S): at 20:14

## 2024-10-30 RX ADMIN — Medication 50 MILLIGRAM(S): at 08:46

## 2024-10-30 RX ADMIN — Medication 60 MILLIGRAM(S): at 08:46

## 2024-10-30 RX ADMIN — ARIPIPRAZOLE 20 MILLIGRAM(S): 2 TABLET ORAL at 08:46

## 2024-10-30 NOTE — BH INPATIENT PSYCHIATRY PROGRESS NOTE - CURRENT MEDICATION
MEDICATIONS  (STANDING):  ARIPiprazole 20 milliGRAM(s) Oral daily  FLUoxetine 60 milliGRAM(s) Oral daily  hydrOXYzine hydrochloride 50 milliGRAM(s) Oral daily  melatonin. 3 milliGRAM(s) Oral at bedtime  prazosin. 1 milliGRAM(s) Oral at bedtime    MEDICATIONS  (PRN):  acetaminophen     Tablet .. 650 milliGRAM(s) Oral every 6 hours PRN Mild Pain (1 - 3), Moderate Pain (4 - 6)  hydrOXYzine hydrochloride 50 milliGRAM(s) Oral every 6 hours PRN Anxiety
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
## 2024-10-30 NOTE — BH INPATIENT PSYCHIATRY PROGRESS NOTE - PRN MEDS
MEDICATIONS  (PRN):  acetaminophen     Tablet .. 650 milliGRAM(s) Oral every 6 hours PRN Mild Pain (1 - 3), Moderate Pain (4 - 6)  hydrOXYzine hydrochloride 50 milliGRAM(s) Oral every 6 hours PRN Anxiety
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
## 2024-10-30 NOTE — BH INPATIENT PSYCHIATRY PROGRESS NOTE - NSBHCHARTREVIEWVS_PSY_A_CORE FT
Vital Signs Last 24 Hrs  T(C): 36.6 (10-30-24 @ 08:19), Max: 37.2 (10-29-24 @ 20:26)  T(F): 97.9 (10-30-24 @ 08:19), Max: 99 (10-29-24 @ 20:26)  HR: --  BP: --  BP(mean): --  RR: --  SpO2: --    Orthostatic VS  10-30-24 @ 08:19  Lying BP: --/-- HR: --  Sitting BP: 112/71 HR: 103  Standing BP: 108/74 HR: 94  Site: upper right arm  Mode: electronic  Orthostatic VS  10-29-24 @ 08:39  Lying BP: --/-- HR: --  Sitting BP: 118/75 HR: 92  Standing BP: 115/79 HR: 102  Site: --  Mode: --  Orthostatic VS  10-28-24 @ 19:41  Lying BP: --/-- HR: --  Sitting BP: 110/71 HR: 90  Standing BP: 128/75 HR: 100  Site: --  Mode: --

## 2024-10-31 PROCEDURE — 99232 SBSQ HOSP IP/OBS MODERATE 35: CPT

## 2024-10-31 PROCEDURE — 90853 GROUP PSYCHOTHERAPY: CPT

## 2024-10-31 RX ORDER — TRAZODONE HYDROCHLORIDE 100 MG/1
25 TABLET ORAL AT BEDTIME
Refills: 0 | Status: DISCONTINUED | OUTPATIENT
Start: 2024-10-31 | End: 2024-10-31

## 2024-10-31 RX ORDER — TRAZODONE HYDROCHLORIDE 100 MG/1
25 TABLET ORAL AT BEDTIME
Refills: 0 | Status: DISCONTINUED | OUTPATIENT
Start: 2024-10-31 | End: 2024-11-08

## 2024-10-31 RX ADMIN — ARIPIPRAZOLE 20 MILLIGRAM(S): 2 TABLET ORAL at 09:01

## 2024-10-31 RX ADMIN — TRAZODONE HYDROCHLORIDE 25 MILLIGRAM(S): 100 TABLET ORAL at 20:47

## 2024-10-31 RX ADMIN — Medication 3 MILLIGRAM(S): at 20:48

## 2024-10-31 RX ADMIN — Medication 1 MILLIGRAM(S): at 20:47

## 2024-10-31 RX ADMIN — Medication 50 MILLIGRAM(S): at 09:01

## 2024-10-31 RX ADMIN — Medication 60 MILLIGRAM(S): at 09:01

## 2024-10-31 NOTE — BH INPATIENT PSYCHIATRY PROGRESS NOTE - NSBHFUPINTERVALHXFT_PSY_A_CORE
Chart reviewed, including vital signs, medication administration record, lab results, and nursing notes.   Case discussed with nursing, psychology, psych rehab, and social work in team meeting.   - No acute events overnight  Patient is seen in interview room. Pt reports poor sleep overnight, is endorsing vague AH that do not appear psychotic in nature, denies SI or urges to self injure. Tolerating meds well, denies side effects. Is visible in milieu enjoyed unit Halloween party.

## 2024-10-31 NOTE — BH INPATIENT PSYCHIATRY PROGRESS NOTE - CURRENT MEDICATION
MEDICATIONS  (STANDING):  ARIPiprazole 20 milliGRAM(s) Oral daily  FLUoxetine 60 milliGRAM(s) Oral daily  hydrOXYzine hydrochloride 50 milliGRAM(s) Oral daily  melatonin. 3 milliGRAM(s) Oral at bedtime  prazosin. 1 milliGRAM(s) Oral at bedtime    MEDICATIONS  (PRN):  acetaminophen     Tablet .. 650 milliGRAM(s) Oral every 6 hours PRN Mild Pain (1 - 3), Moderate Pain (4 - 6)  hydrOXYzine hydrochloride 50 milliGRAM(s) Oral every 6 hours PRN Anxiety  traZODone 25 milliGRAM(s) Oral at bedtime PRN insomnia

## 2024-10-31 NOTE — BH INPATIENT PSYCHIATRY PROGRESS NOTE - NSBHCHARTREVIEWVS_PSY_A_CORE FT
Vital Signs Last 24 Hrs  T(C): 36.7 (10-31-24 @ 08:18), Max: 36.7 (10-31-24 @ 08:18)  T(F): 98.1 (10-31-24 @ 08:18), Max: 98.1 (10-31-24 @ 08:18)  HR: --  BP: --  BP(mean): --  RR: 15 (10-31-24 @ 08:18) (15 - 15)  SpO2: --    Orthostatic VS  10-31-24 @ 08:18  Lying BP: --/-- HR: --  Sitting BP: 125/76 HR: 107  Standing BP: 113/81 HR: 116  Site: --  Mode: --  Orthostatic VS  10-30-24 @ 08:19  Lying BP: --/-- HR: --  Sitting BP: 112/71 HR: 103  Standing BP: 108/74 HR: 94  Site: upper right arm  Mode: electronic

## 2024-10-31 NOTE — BH INPATIENT PSYCHIATRY PROGRESS NOTE - NSBHMETABOLIC_PSY_ALL_CORE_FT
BMI: BMI (kg/m2): 18.7 (10-22-24 @ 22:07)  HbA1c: A1C with Estimated Average Glucose Result: 4.9 % (10-23-24 @ 10:12)    Glucose:   BP: --Vital Signs Last 24 Hrs  T(C): 36.7 (10-31-24 @ 08:18), Max: 36.7 (10-31-24 @ 08:18)  T(F): 98.1 (10-31-24 @ 08:18), Max: 98.1 (10-31-24 @ 08:18)  HR: --  BP: --  BP(mean): --  RR: 15 (10-31-24 @ 08:18) (15 - 15)  SpO2: --    Orthostatic VS  10-31-24 @ 08:18  Lying BP: --/-- HR: --  Sitting BP: 125/76 HR: 107  Standing BP: 113/81 HR: 116  Site: --  Mode: --  Orthostatic VS  10-30-24 @ 08:19  Lying BP: --/-- HR: --  Sitting BP: 112/71 HR: 103  Standing BP: 108/74 HR: 94  Site: upper right arm  Mode: electronic    Lipid Panel: Date/Time: 10-23-24 @ 10:12  Cholesterol, Serum: 195  LDL Cholesterol Calculated: 127  HDL Cholesterol, Serum: 50  Total Cholesterol/HDL Ration Measurement: --  Triglycerides, Serum: 92
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
## 2024-10-31 NOTE — BH INPATIENT PSYCHIATRY PROGRESS NOTE - PRN MEDS
MEDICATIONS  (PRN):  acetaminophen     Tablet .. 650 milliGRAM(s) Oral every 6 hours PRN Mild Pain (1 - 3), Moderate Pain (4 - 6)  hydrOXYzine hydrochloride 50 milliGRAM(s) Oral every 6 hours PRN Anxiety  traZODone 25 milliGRAM(s) Oral at bedtime PRN insomnia

## 2024-11-01 PROCEDURE — 99232 SBSQ HOSP IP/OBS MODERATE 35: CPT

## 2024-11-01 RX ORDER — MELATONIN 5 MG
5 TABLET ORAL AT BEDTIME
Refills: 0 | Status: DISCONTINUED | OUTPATIENT
Start: 2024-11-01 | End: 2024-11-08

## 2024-11-01 RX ADMIN — Medication 1 MILLIGRAM(S): at 21:37

## 2024-11-01 RX ADMIN — Medication 5 MILLIGRAM(S): at 21:36

## 2024-11-01 RX ADMIN — ARIPIPRAZOLE 20 MILLIGRAM(S): 2 TABLET ORAL at 08:28

## 2024-11-01 RX ADMIN — Medication 60 MILLIGRAM(S): at 08:27

## 2024-11-01 RX ADMIN — Medication 50 MILLIGRAM(S): at 08:28

## 2024-11-01 NOTE — BH INPATIENT PSYCHIATRY PROGRESS NOTE - NSBHFUPINTERVALHXFT_PSY_A_CORE
f/up recurrent MDD, care discussed w/ tx team, VSS. Patient reported experiencing passive SI thoughts today, no intent or plan. Patient cited the milieu, therapy, groups to be helping slightly. patient reported ++AH, last heard yesterday and ++VH of figures, last seen on monday. Patient out in the milieu, observed attending groups and social with peers. TAking meds, no SE reported. reported broken sleep, took melatonin with partial effect, agreeing to increase in Melatonin. reported appetite improving. reported nightmares last night.
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
## 2024-11-01 NOTE — BH INPATIENT PSYCHIATRY PROGRESS NOTE - NSBHMETABOLIC_PSY_ALL_CORE_FT
BMI: BMI (kg/m2): 18.7 (10-22-24 @ 22:07)  HbA1c: A1C with Estimated Average Glucose Result: 4.9 % (10-23-24 @ 10:12)    Glucose:   BP: --Vital Signs Last 24 Hrs  T(C): 36.8 (11-01-24 @ 08:21), Max: 37.4 (10-31-24 @ 20:28)  T(F): 98.2 (11-01-24 @ 08:21), Max: 99.4 (10-31-24 @ 20:28)  HR: --  BP: --  BP(mean): --  RR: --  SpO2: --    Orthostatic VS  11-01-24 @ 08:21  Lying BP: --/-- HR: --  Sitting BP: 104/67 HR: 96  Standing BP: 102/58 HR: 106  Site: --  Mode: --  Orthostatic VS  10-31-24 @ 08:18  Lying BP: --/-- HR: --  Sitting BP: 125/76 HR: 107  Standing BP: 113/81 HR: 116  Site: --  Mode: --    Lipid Panel: Date/Time: 10-23-24 @ 10:12  Cholesterol, Serum: 195  LDL Cholesterol Calculated: 127  HDL Cholesterol, Serum: 50  Total Cholesterol/HDL Ration Measurement: --  Triglycerides, Serum: 92

## 2024-11-01 NOTE — BH INPATIENT PSYCHIATRY PROGRESS NOTE - NSBHCHARTREVIEWVS_PSY_A_CORE FT
Vital Signs Last 24 Hrs  T(C): 36.8 (11-01-24 @ 08:21), Max: 37.4 (10-31-24 @ 20:28)  T(F): 98.2 (11-01-24 @ 08:21), Max: 99.4 (10-31-24 @ 20:28)  HR: --  BP: --  BP(mean): --  RR: --  SpO2: --    Orthostatic VS  11-01-24 @ 08:21  Lying BP: --/-- HR: --  Sitting BP: 104/67 HR: 96  Standing BP: 102/58 HR: 106  Site: --  Mode: --  Orthostatic VS  10-31-24 @ 08:18  Lying BP: --/-- HR: --  Sitting BP: 125/76 HR: 107  Standing BP: 113/81 HR: 116  Site: --  Mode: --

## 2024-11-02 RX ORDER — DIPHENHYDRAMINE HCL 12.5MG/5ML
50 ELIXIR ORAL AT BEDTIME
Refills: 0 | Status: DISCONTINUED | OUTPATIENT
Start: 2024-11-02 | End: 2024-11-08

## 2024-11-02 RX ORDER — IBUPROFEN 200 MG
400 TABLET ORAL EVERY 4 HOURS
Refills: 0 | Status: DISCONTINUED | OUTPATIENT
Start: 2024-11-02 | End: 2024-11-08

## 2024-11-02 RX ADMIN — ARIPIPRAZOLE 20 MILLIGRAM(S): 2 TABLET ORAL at 09:46

## 2024-11-02 RX ADMIN — Medication 50 MILLIGRAM(S): at 22:45

## 2024-11-02 RX ADMIN — Medication 5 MILLIGRAM(S): at 20:26

## 2024-11-02 RX ADMIN — Medication 400 MILLIGRAM(S): at 23:15

## 2024-11-02 RX ADMIN — Medication 1 MILLIGRAM(S): at 20:26

## 2024-11-02 RX ADMIN — Medication 60 MILLIGRAM(S): at 09:46

## 2024-11-02 RX ADMIN — Medication 650 MILLIGRAM(S): at 16:59

## 2024-11-02 RX ADMIN — Medication 50 MILLIGRAM(S): at 09:46

## 2024-11-02 RX ADMIN — Medication 50 MILLIGRAM(S): at 12:26

## 2024-11-02 RX ADMIN — Medication 400 MILLIGRAM(S): at 22:45

## 2024-11-03 RX ADMIN — Medication 50 MILLIGRAM(S): at 08:56

## 2024-11-03 RX ADMIN — Medication 5 MILLIGRAM(S): at 20:36

## 2024-11-03 RX ADMIN — Medication 400 MILLIGRAM(S): at 17:38

## 2024-11-03 RX ADMIN — Medication 400 MILLIGRAM(S): at 09:51

## 2024-11-03 RX ADMIN — Medication 60 MILLIGRAM(S): at 08:55

## 2024-11-03 RX ADMIN — Medication 1 MILLIGRAM(S): at 20:35

## 2024-11-03 RX ADMIN — ARIPIPRAZOLE 20 MILLIGRAM(S): 2 TABLET ORAL at 08:56

## 2024-11-03 RX ADMIN — TRAZODONE HYDROCHLORIDE 25 MILLIGRAM(S): 100 TABLET ORAL at 22:25

## 2024-11-03 RX ADMIN — Medication 400 MILLIGRAM(S): at 21:25

## 2024-11-04 PROCEDURE — 99232 SBSQ HOSP IP/OBS MODERATE 35: CPT

## 2024-11-04 RX ORDER — PRAZOSIN HCL 1 MG
2 CAPSULE ORAL AT BEDTIME
Refills: 0 | Status: DISCONTINUED | OUTPATIENT
Start: 2024-11-04 | End: 2024-11-08

## 2024-11-04 RX ADMIN — Medication 50 MILLIGRAM(S): at 09:30

## 2024-11-04 RX ADMIN — ARIPIPRAZOLE 20 MILLIGRAM(S): 2 TABLET ORAL at 09:30

## 2024-11-04 RX ADMIN — Medication 5 MILLIGRAM(S): at 20:08

## 2024-11-04 RX ADMIN — Medication 50 MILLIGRAM(S): at 22:56

## 2024-11-04 RX ADMIN — Medication 60 MILLIGRAM(S): at 09:30

## 2024-11-04 RX ADMIN — Medication 2 MILLIGRAM(S): at 20:08

## 2024-11-04 NOTE — BH INPATIENT PSYCHIATRY PROGRESS NOTE - NSBHFUPINTERVALHXFT_PSY_A_CORE
Chart reviewed, including vital signs, medication administration record, lab results, and nursing notes.   Case discussed with nursing, psychology, psych rehab, and social work in team meeting.   - No acute events overnight    Patient is seen in the group room under no acute distress and amenable to interview. The patient continues to complain of poor sleep, which she attributes to nightmares. She feels that prazosin has helped slightly and agrees with increasing the dose. She reported some suicidal thoughts with thoughts of cutting herself but was able to regulate herself after talking to staff. She reports no medication side effects. Reports decreased sleep and stable appetite. She finds DBT skills such as breathing exercises helpful but acknowledges that she struggles to use these skills when she is in a state of emotional distress. Patient reports stable sleep and appetite. Denies SI/HI/AVH. Reports no medication side effects.

## 2024-11-04 NOTE — BH INPATIENT PSYCHIATRY PROGRESS NOTE - CURRENT MEDICATION
MEDICATIONS  (STANDING):  ARIPiprazole 20 milliGRAM(s) Oral daily  FLUoxetine 60 milliGRAM(s) Oral daily  hydrOXYzine hydrochloride 50 milliGRAM(s) Oral daily  melatonin. 5 milliGRAM(s) Oral at bedtime  prazosin. 2 milliGRAM(s) Oral at bedtime    MEDICATIONS  (PRN):  acetaminophen     Tablet .. 650 milliGRAM(s) Oral every 6 hours PRN Mild Pain (1 - 3), Moderate Pain (4 - 6)  diphenhydrAMINE 50 milliGRAM(s) Oral at bedtime PRN insomnia  hydrOXYzine hydrochloride 50 milliGRAM(s) Oral every 6 hours PRN Anxiety  ibuprofen  Tablet. 400 milliGRAM(s) Oral every 4 hours PRN Mild Pain (1 - 3), Moderate Pain (4 - 6)  traZODone 25 milliGRAM(s) Oral at bedtime PRN insomnia

## 2024-11-04 NOTE — BH INPATIENT PSYCHIATRY PROGRESS NOTE - PRN MEDS
MEDICATIONS  (PRN):  acetaminophen     Tablet .. 650 milliGRAM(s) Oral every 6 hours PRN Mild Pain (1 - 3), Moderate Pain (4 - 6)  diphenhydrAMINE 50 milliGRAM(s) Oral at bedtime PRN insomnia  hydrOXYzine hydrochloride 50 milliGRAM(s) Oral every 6 hours PRN Anxiety  ibuprofen  Tablet. 400 milliGRAM(s) Oral every 4 hours PRN Mild Pain (1 - 3), Moderate Pain (4 - 6)  traZODone 25 milliGRAM(s) Oral at bedtime PRN insomnia

## 2024-11-04 NOTE — BH INPATIENT PSYCHIATRY PROGRESS NOTE - NSBHMETABOLIC_PSY_ALL_CORE_FT
BMI: BMI (kg/m2): 18.7 (10-22-24 @ 22:07)  HbA1c: A1C with Estimated Average Glucose Result: 4.9 % (10-23-24 @ 10:12)    Glucose:   BP: --Vital Signs Last 24 Hrs  T(C): 36.4 (11-04-24 @ 07:57), Max: 36.8 (11-03-24 @ 20:37)  T(F): 97.5 (11-04-24 @ 07:57), Max: 98.2 (11-03-24 @ 20:37)  HR: --  BP: --  BP(mean): --  RR: 19 (11-03-24 @ 20:37) (19 - 19)  SpO2: --    Orthostatic VS  11-04-24 @ 07:57  Lying BP: --/-- HR: --  Sitting BP: 112/61 HR: 78  Standing BP: 114/66 HR: 103  Site: --  Mode: --  Orthostatic VS  11-03-24 @ 20:37  Lying BP: --/-- HR: --  Sitting BP: 125/78 HR: 85  Standing BP: 122/78 HR: 83  Site: --  Mode: --  Orthostatic VS  11-03-24 @ 08:25  Lying BP: --/-- HR: --  Sitting BP: 119/76 HR: 105  Standing BP: 113/76 HR: 108  Site: upper right arm  Mode: electronic    Lipid Panel: Date/Time: 10-23-24 @ 10:12  Cholesterol, Serum: 195  LDL Cholesterol Calculated: 127  HDL Cholesterol, Serum: 50  Total Cholesterol/HDL Ration Measurement: --  Triglycerides, Serum: 92
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
## 2024-11-04 NOTE — BH INPATIENT PSYCHIATRY PROGRESS NOTE - NSBHCHARTREVIEWVS_PSY_A_CORE FT
Vital Signs Last 24 Hrs  T(C): 36.4 (11-04-24 @ 07:57), Max: 36.8 (11-03-24 @ 20:37)  T(F): 97.5 (11-04-24 @ 07:57), Max: 98.2 (11-03-24 @ 20:37)  HR: --  BP: --  BP(mean): --  RR: 19 (11-03-24 @ 20:37) (19 - 19)  SpO2: --    Orthostatic VS  11-04-24 @ 07:57  Lying BP: --/-- HR: --  Sitting BP: 112/61 HR: 78  Standing BP: 114/66 HR: 103  Site: --  Mode: --  Orthostatic VS  11-03-24 @ 20:37  Lying BP: --/-- HR: --  Sitting BP: 125/78 HR: 85  Standing BP: 122/78 HR: 83  Site: --  Mode: --  Orthostatic VS  11-03-24 @ 08:25  Lying BP: --/-- HR: --  Sitting BP: 119/76 HR: 105  Standing BP: 113/76 HR: 108  Site: upper right arm  Mode: electronic

## 2024-11-05 DIAGNOSIS — F60.3 BORDERLINE PERSONALITY DISORDER: ICD-10-CM

## 2024-11-05 PROCEDURE — 99232 SBSQ HOSP IP/OBS MODERATE 35: CPT

## 2024-11-05 RX ORDER — ARIPIPRAZOLE 2 MG/1
10 TABLET ORAL DAILY
Refills: 0 | Status: DISCONTINUED | OUTPATIENT
Start: 2024-11-06 | End: 2024-11-08

## 2024-11-05 RX ADMIN — ARIPIPRAZOLE 20 MILLIGRAM(S): 2 TABLET ORAL at 08:33

## 2024-11-05 RX ADMIN — Medication 60 MILLIGRAM(S): at 08:33

## 2024-11-05 RX ADMIN — Medication 50 MILLIGRAM(S): at 21:36

## 2024-11-05 RX ADMIN — Medication 2 MILLIGRAM(S): at 20:40

## 2024-11-05 RX ADMIN — Medication 5 MILLIGRAM(S): at 20:41

## 2024-11-05 RX ADMIN — Medication 50 MILLIGRAM(S): at 08:33
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
## 2024-11-05 NOTE — BH INPATIENT PSYCHIATRY PROGRESS NOTE - NSBHFUPINTERVALHXFT_PSY_A_CORE
Chart reviewed, case discussed in team. No acute events overnight, pt reports she had nightmares but was able to get sufficient sleep, does not endorse being particularly bothered by dreams. Report this morning that she felt down and had some passive SI, but was able to eat breakfast and work through it, which she felt was progress. She denies any lingering SI or urges to self injure. Continues to report restlessness likely related to Abilify and also endorses "Auditory hallucinations" that are not affected by Abilify dose, thus discussed plan to try a day at lower Abilify dose tomorrow to determine if it is playing a role in sleep disturbance.

## 2024-11-05 NOTE — BH INPATIENT PSYCHIATRY PROGRESS NOTE - NSBHMETABOLIC_PSY_ALL_CORE_FT
BMI: BMI (kg/m2): 18.7 (10-22-24 @ 22:07)  HbA1c: A1C with Estimated Average Glucose Result: 4.9 % (10-23-24 @ 10:12)    Glucose:   BP: --Vital Signs Last 24 Hrs  T(C): 36.3 (11-05-24 @ 06:41), Max: 36.9 (11-04-24 @ 20:24)  T(F): 97.4 (11-05-24 @ 06:41), Max: 98.4 (11-04-24 @ 20:24)  HR: --  BP: --  BP(mean): --  RR: 18 (11-05-24 @ 06:41) (18 - 18)  SpO2: --    Orthostatic VS  11-05-24 @ 06:41  Lying BP: --/-- HR: --  Sitting BP: 115/78 HR: 87  Standing BP: 110/70 HR: 79  Site: --  Mode: --  Orthostatic VS  11-04-24 @ 20:24  Lying BP: --/-- HR: --  Sitting BP: 115/68 HR: 90  Standing BP: 124/74 HR: 98  Site: --  Mode: --  Orthostatic VS  11-04-24 @ 07:57  Lying BP: --/-- HR: --  Sitting BP: 112/61 HR: 78  Standing BP: 114/66 HR: 103  Site: --  Mode: --  Orthostatic VS  11-03-24 @ 20:37  Lying BP: --/-- HR: --  Sitting BP: 125/78 HR: 85  Standing BP: 122/78 HR: 83  Site: --  Mode: --    Lipid Panel: Date/Time: 10-23-24 @ 10:12  Cholesterol, Serum: 195  LDL Cholesterol Calculated: 127  HDL Cholesterol, Serum: 50  Total Cholesterol/HDL Ration Measurement: --  Triglycerides, Serum: 92

## 2024-11-05 NOTE — BH INPATIENT PSYCHIATRY PROGRESS NOTE - NSBHCHARTREVIEWVS_PSY_A_CORE FT
Vital Signs Last 24 Hrs  T(C): 36.3 (11-05-24 @ 06:41), Max: 36.9 (11-04-24 @ 20:24)  T(F): 97.4 (11-05-24 @ 06:41), Max: 98.4 (11-04-24 @ 20:24)  HR: --  BP: --  BP(mean): --  RR: 18 (11-05-24 @ 06:41) (18 - 18)  SpO2: --    Orthostatic VS  11-05-24 @ 06:41  Lying BP: --/-- HR: --  Sitting BP: 115/78 HR: 87  Standing BP: 110/70 HR: 79  Site: --  Mode: --  Orthostatic VS  11-04-24 @ 20:24  Lying BP: --/-- HR: --  Sitting BP: 115/68 HR: 90  Standing BP: 124/74 HR: 98  Site: --  Mode: --  Orthostatic VS  11-04-24 @ 07:57  Lying BP: --/-- HR: --  Sitting BP: 112/61 HR: 78  Standing BP: 114/66 HR: 103  Site: --  Mode: --  Orthostatic VS  11-03-24 @ 20:37  Lying BP: --/-- HR: --  Sitting BP: 125/78 HR: 85  Standing BP: 122/78 HR: 83  Site: --  Mode: --

## 2024-11-06 PROCEDURE — 90853 GROUP PSYCHOTHERAPY: CPT

## 2024-11-06 PROCEDURE — 99232 SBSQ HOSP IP/OBS MODERATE 35: CPT

## 2024-11-06 RX ORDER — LORAZEPAM 2 MG
1 TABLET ORAL EVERY 6 HOURS
Refills: 0 | Status: DISCONTINUED | OUTPATIENT
Start: 2024-11-06 | End: 2024-11-08

## 2024-11-06 RX ORDER — LORAZEPAM 2 MG
1 TABLET ORAL ONCE
Refills: 0 | Status: DISCONTINUED | OUTPATIENT
Start: 2024-11-06 | End: 2024-11-08

## 2024-11-06 RX ORDER — LORAZEPAM 2 MG
1 TABLET ORAL ONCE
Refills: 0 | Status: DISCONTINUED | OUTPATIENT
Start: 2024-11-06 | End: 2024-11-06

## 2024-11-06 RX ADMIN — Medication 50 MILLIGRAM(S): at 21:40

## 2024-11-06 RX ADMIN — Medication 60 MILLIGRAM(S): at 08:38

## 2024-11-06 RX ADMIN — Medication 50 MILLIGRAM(S): at 08:37

## 2024-11-06 RX ADMIN — Medication 2 MILLIGRAM(S): at 21:36

## 2024-11-06 RX ADMIN — Medication 1 MILLIGRAM(S): at 12:11

## 2024-11-06 RX ADMIN — ARIPIPRAZOLE 10 MILLIGRAM(S): 2 TABLET ORAL at 08:37

## 2024-11-06 NOTE — BH INPATIENT PSYCHIATRY PROGRESS NOTE - NSBHCHARTREVIEWVS_PSY_A_CORE FT
Vital Signs Last 24 Hrs  T(C): 36.5 (11-06-24 @ 20:40), Max: 36.5 (11-06-24 @ 20:40)  T(F): 97.7 (11-06-24 @ 20:40), Max: 97.7 (11-06-24 @ 20:40)  HR: --  BP: --  BP(mean): --  RR: --  SpO2: --    Orthostatic VS  11-06-24 @ 20:40  Lying BP: --/-- HR: --  Sitting BP: 108/78 HR: 95  Standing BP: 115/78 HR: 98  Site: --  Mode: --  Orthostatic VS  11-06-24 @ 08:36  Lying BP: --/-- HR: --  Sitting BP: 125/73 HR: 106  Standing BP: 125/74 HR: 104  Site: upper right arm  Mode: electronic  Orthostatic VS  11-05-24 @ 21:11  Lying BP: --/-- HR: --  Sitting BP: 113/68 HR: 95  Standing BP: 111/72 HR: 102  Site: --  Mode: --  Orthostatic VS  11-05-24 @ 06:41  Lying BP: --/-- HR: --  Sitting BP: 115/78 HR: 87  Standing BP: 110/70 HR: 79  Site: --  Mode: --   Vital Signs Last 24 Hrs  T(C): 36.7 (11-07-24 @ 07:58), Max: 36.7 (11-07-24 @ 07:58)  T(F): 98 (11-07-24 @ 07:58), Max: 98 (11-07-24 @ 07:58)  HR: --  BP: --  BP(mean): --  RR: 14 (11-07-24 @ 07:58) (14 - 14)  SpO2: --    Orthostatic VS  11-07-24 @ 07:58  Lying BP: --/-- HR: --  Sitting BP: 115/73 HR: 113  Standing BP: 111/78 HR: 118  Site: --  Mode: --  Orthostatic VS  11-06-24 @ 20:40  Lying BP: --/-- HR: --  Sitting BP: 108/78 HR: 95  Standing BP: 115/78 HR: 98  Site: --  Mode: --  Orthostatic VS  11-06-24 @ 08:36  Lying BP: --/-- HR: --  Sitting BP: 125/73 HR: 106  Standing BP: 125/74 HR: 104  Site: upper right arm  Mode: electronic  Orthostatic VS  11-05-24 @ 21:11  Lying BP: --/-- HR: --  Sitting BP: 113/68 HR: 95  Standing BP: 111/72 HR: 102  Site: --  Mode: --
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
## 2024-11-06 NOTE — BH INPATIENT PSYCHIATRY PROGRESS NOTE - NSBHFUPINTERVALHXFT_PSY_A_CORE
Chart reviewed, case discussed in team. No acute events overnight, pt reports she had nightmares but was able to get sufficient sleep, does not endorse being particularly bothered by dreams. Report this morning that she felt down and had some passive SI, but was able to eat breakfast and work through it, which she felt was progress. She denies any lingering SI or urges to self injure. Continues to report restlessness likely related to Abilify and also endorses "Auditory hallucinations" that are not affected by Abilify dose, thus discussed plan to try a day at lower Abilify dose tomorrow to determine if it is playing a role in sleep disturbance.  Chart reviewed, case discussed in team. No acute events overnight, pt reports she did not have nightmares, was able to get sufficient sleep, does not endorse being particularly bothered by dreams and tolerated med changes well. Report this morning that she felt down and had some passive SI in context to concerns of status of relationship, responds well to support provided and received Ativan 1mg PO prn x 1 with benefit.  She denies any lingering SI or urges to self injure when assessed later.  Discussed plans for outpatient treatment and hopes for discharge Friday with coping strategies reinforced.  No agitation and no somatic complaints.

## 2024-11-06 NOTE — BH INPATIENT PSYCHIATRY PROGRESS NOTE - NSBHMETABOLIC_PSY_ALL_CORE_FT
BMI: BMI (kg/m2): 18.7 (10-22-24 @ 22:07)  HbA1c: A1C with Estimated Average Glucose Result: 4.9 % (10-23-24 @ 10:12)    Glucose:   BP: --Vital Signs Last 24 Hrs  T(C): 36.5 (11-06-24 @ 20:40), Max: 36.5 (11-06-24 @ 20:40)  T(F): 97.7 (11-06-24 @ 20:40), Max: 97.7 (11-06-24 @ 20:40)  HR: --  BP: --  BP(mean): --  RR: --  SpO2: --    Orthostatic VS  11-06-24 @ 20:40  Lying BP: --/-- HR: --  Sitting BP: 108/78 HR: 95  Standing BP: 115/78 HR: 98  Site: --  Mode: --  Orthostatic VS  11-06-24 @ 08:36  Lying BP: --/-- HR: --  Sitting BP: 125/73 HR: 106  Standing BP: 125/74 HR: 104  Site: upper right arm  Mode: electronic  Orthostatic VS  11-05-24 @ 21:11  Lying BP: --/-- HR: --  Sitting BP: 113/68 HR: 95  Standing BP: 111/72 HR: 102  Site: --  Mode: --  Orthostatic VS  11-05-24 @ 06:41  Lying BP: --/-- HR: --  Sitting BP: 115/78 HR: 87  Standing BP: 110/70 HR: 79  Site: --  Mode: --    Lipid Panel: Date/Time: 10-23-24 @ 10:12  Cholesterol, Serum: 195  LDL Cholesterol Calculated: 127  HDL Cholesterol, Serum: 50  Total Cholesterol/HDL Ration Measurement: --  Triglycerides, Serum: 92   BMI: BMI (kg/m2): 18.7 (10-22-24 @ 22:07)  HbA1c: A1C with Estimated Average Glucose Result: 4.9 % (10-23-24 @ 10:12)    Glucose:   BP: --Vital Signs Last 24 Hrs  T(C): 36.7 (11-07-24 @ 07:58), Max: 36.7 (11-07-24 @ 07:58)  T(F): 98 (11-07-24 @ 07:58), Max: 98 (11-07-24 @ 07:58)  HR: --  BP: --  BP(mean): --  RR: 14 (11-07-24 @ 07:58) (14 - 14)  SpO2: --    Orthostatic VS  11-07-24 @ 07:58  Lying BP: --/-- HR: --  Sitting BP: 115/73 HR: 113  Standing BP: 111/78 HR: 118  Site: --  Mode: --  Orthostatic VS  11-06-24 @ 20:40  Lying BP: --/-- HR: --  Sitting BP: 108/78 HR: 95  Standing BP: 115/78 HR: 98  Site: --  Mode: --  Orthostatic VS  11-06-24 @ 08:36  Lying BP: --/-- HR: --  Sitting BP: 125/73 HR: 106  Standing BP: 125/74 HR: 104  Site: upper right arm  Mode: electronic  Orthostatic VS  11-05-24 @ 21:11  Lying BP: --/-- HR: --  Sitting BP: 113/68 HR: 95  Standing BP: 111/72 HR: 102  Site: --  Mode: --    Lipid Panel: Date/Time: 10-23-24 @ 10:12  Cholesterol, Serum: 195  LDL Cholesterol Calculated: 127  HDL Cholesterol, Serum: 50  Total Cholesterol/HDL Ration Measurement: --  Triglycerides, Serum: 92

## 2024-11-06 NOTE — BH INPATIENT PSYCHIATRY PROGRESS NOTE - PRN MEDS
MEDICATIONS  (PRN):  acetaminophen     Tablet .. 650 milliGRAM(s) Oral every 6 hours PRN Mild Pain (1 - 3), Moderate Pain (4 - 6)  diphenhydrAMINE 50 milliGRAM(s) Oral at bedtime PRN insomnia  hydrOXYzine hydrochloride 50 milliGRAM(s) Oral every 6 hours PRN Anxiety  ibuprofen  Tablet. 400 milliGRAM(s) Oral every 4 hours PRN Mild Pain (1 - 3), Moderate Pain (4 - 6)  LORazepam     Tablet 1 milliGRAM(s) Oral every 6 hours PRN severe anxiety/ agitation  LORazepam   Injectable 1 milliGRAM(s) IntraMuscular once PRN Agitation  traZODone 25 milliGRAM(s) Oral at bedtime PRN insomnia

## 2024-11-06 NOTE — BH INPATIENT PSYCHIATRY PROGRESS NOTE - CURRENT MEDICATION
MEDICATIONS  (STANDING):  ARIPiprazole 10 milliGRAM(s) Oral daily  FLUoxetine 60 milliGRAM(s) Oral daily  hydrOXYzine hydrochloride 50 milliGRAM(s) Oral daily  melatonin. 5 milliGRAM(s) Oral at bedtime  prazosin. 2 milliGRAM(s) Oral at bedtime    MEDICATIONS  (PRN):  acetaminophen     Tablet .. 650 milliGRAM(s) Oral every 6 hours PRN Mild Pain (1 - 3), Moderate Pain (4 - 6)  diphenhydrAMINE 50 milliGRAM(s) Oral at bedtime PRN insomnia  hydrOXYzine hydrochloride 50 milliGRAM(s) Oral every 6 hours PRN Anxiety  ibuprofen  Tablet. 400 milliGRAM(s) Oral every 4 hours PRN Mild Pain (1 - 3), Moderate Pain (4 - 6)  LORazepam     Tablet 1 milliGRAM(s) Oral every 6 hours PRN severe anxiety/ agitation  LORazepam   Injectable 1 milliGRAM(s) IntraMuscular once PRN Agitation  traZODone 25 milliGRAM(s) Oral at bedtime PRN insomnia

## 2024-11-07 PROCEDURE — 90853 GROUP PSYCHOTHERAPY: CPT

## 2024-11-07 PROCEDURE — 99232 SBSQ HOSP IP/OBS MODERATE 35: CPT

## 2024-11-07 RX ORDER — FLUOXETINE HCL 10 MG
3 CAPSULE ORAL
Qty: 90 | Refills: 0
Start: 2024-11-07 | End: 2024-12-06

## 2024-11-07 RX ORDER — ARIPIPRAZOLE 2 MG/1
1 TABLET ORAL
Qty: 30 | Refills: 0
Start: 2024-11-07 | End: 2024-12-06

## 2024-11-07 RX ORDER — PRAZOSIN HCL 1 MG
1 CAPSULE ORAL
Qty: 30 | Refills: 0
Start: 2024-11-07 | End: 2024-12-06

## 2024-11-07 RX ORDER — HYDROXYZINE HCL 25 MG
1 TABLET ORAL
Qty: 30 | Refills: 0
Start: 2024-11-07 | End: 2024-12-06

## 2024-11-07 RX ADMIN — Medication 60 MILLIGRAM(S): at 08:34

## 2024-11-07 RX ADMIN — Medication 50 MILLIGRAM(S): at 08:34

## 2024-11-07 RX ADMIN — ARIPIPRAZOLE 10 MILLIGRAM(S): 2 TABLET ORAL at 08:34

## 2024-11-07 RX ADMIN — Medication 50 MILLIGRAM(S): at 20:58

## 2024-11-07 RX ADMIN — Medication 2 MILLIGRAM(S): at 20:59

## 2024-11-07 NOTE — BH INPATIENT PSYCHIATRY PROGRESS NOTE - NSBHMSETHTCONTENT_PSY_A_CORE
Preoccupations/Hopelessness/Suicidality/Other
Preoccupations/Hopelessness/Suicidality
Preoccupations/Hopelessness/Suicidality
Preoccupations/Hopelessness/Suicidality/Other
Preoccupations/Hopelessness/Suicidality/Other
Preoccupations/Hopelessness/Suicidality
Preoccupations/Hopelessness/Suicidality/Other
Preoccupations/Hopelessness/Suicidality
Preoccupations/Hopelessness/Suicidality/Other

## 2024-11-07 NOTE — BH INPATIENT PSYCHIATRY PROGRESS NOTE - NSTXSUICIDGOAL_PSY_ALL_CORE
Will develop a suicide prevention/safety plan

## 2024-11-07 NOTE — BH INPATIENT PSYCHIATRY PROGRESS NOTE - NSTXSUICIDDATETRGT_PSY_ALL_CORE
30-Oct-2024
06-Nov-2024
30-Oct-2024
30-Oct-2024
13-Nov-2024
06-Nov-2024
06-Nov-2024
30-Oct-2024
06-Nov-2024
30-Oct-2024
30-Oct-2024
13-Nov-2024
30-Oct-2024

## 2024-11-07 NOTE — BH INPATIENT PSYCHIATRY PROGRESS NOTE - NSBHMSEMOOD_PSY_A_CORE
Depressed
Depressed/Anxious
Depressed

## 2024-11-07 NOTE — BH INPATIENT PSYCHIATRY PROGRESS NOTE - NSICDXBHTERTIARYDX_PSY_ALL_CORE
R/O Post traumatic stress disorder (PTSD)   F43.10  

## 2024-11-07 NOTE — BH INPATIENT PSYCHIATRY PROGRESS NOTE - NSBHMETABOLIC_PSY_ALL_CORE_FT
BMI: BMI (kg/m2): 18.7 (10-22-24 @ 22:07)  HbA1c: A1C with Estimated Average Glucose Result: 4.9 % (10-23-24 @ 10:12)    Glucose:   BP: --Vital Signs Last 24 Hrs  T(C): 36.7 (11-07-24 @ 20:41), Max: 36.7 (11-07-24 @ 07:58)  T(F): 98 (11-07-24 @ 20:41), Max: 98 (11-07-24 @ 07:58)  HR: --  BP: --  BP(mean): --  RR: 14 (11-07-24 @ 07:58) (14 - 14)  SpO2: --    Orthostatic VS  11-07-24 @ 20:41  Lying BP: --/-- HR: --  Sitting BP: 125/86 HR: 86  Standing BP: 121/83 HR: 99  Site: upper right arm  Mode: electronic  Orthostatic VS  11-07-24 @ 07:58  Lying BP: --/-- HR: --  Sitting BP: 115/73 HR: 113  Standing BP: 111/78 HR: 118  Site: --  Mode: --  Orthostatic VS  11-06-24 @ 20:40  Lying BP: --/-- HR: --  Sitting BP: 108/78 HR: 95  Standing BP: 115/78 HR: 98  Site: --  Mode: --  Orthostatic VS  11-06-24 @ 08:36  Lying BP: --/-- HR: --  Sitting BP: 125/73 HR: 106  Standing BP: 125/74 HR: 104  Site: upper right arm  Mode: electronic    Lipid Panel: Date/Time: 10-23-24 @ 10:12  Cholesterol, Serum: 195  LDL Cholesterol Calculated: 127  HDL Cholesterol, Serum: 50  Total Cholesterol/HDL Ration Measurement: --  Triglycerides, Serum: 92   BMI: BMI (kg/m2): 18.7 (10-22-24 @ 22:07)  HbA1c: A1C with Estimated Average Glucose Result: 4.9 % (10-23-24 @ 10:12)    Glucose:   BP: --Vital Signs Last 24 Hrs  T(C): 36.6 (11-08-24 @ 06:31), Max: 36.6 (11-08-24 @ 06:31)  T(F): 97.9 (11-08-24 @ 06:31), Max: 97.9 (11-08-24 @ 06:31)  HR: --  BP: --  BP(mean): --  RR: --  SpO2: --    Orthostatic VS  11-08-24 @ 06:31  Lying BP: --/-- HR: --  Sitting BP: 106/72 HR: 96  Standing BP: 116/72 HR: 113  Site: --  Mode: --  Orthostatic VS  11-07-24 @ 20:41  Lying BP: --/-- HR: --  Sitting BP: 125/86 HR: 86  Standing BP: 121/83 HR: 99  Site: upper right arm  Mode: electronic  Orthostatic VS  11-07-24 @ 07:58  Lying BP: --/-- HR: --  Sitting BP: 115/73 HR: 113  Standing BP: 111/78 HR: 118  Site: --  Mode: --    Lipid Panel: Date/Time: 10-23-24 @ 10:12  Cholesterol, Serum: 195  LDL Cholesterol Calculated: 127  HDL Cholesterol, Serum: 50  Total Cholesterol/HDL Ration Measurement: --  Triglycerides, Serum: 92

## 2024-11-07 NOTE — BH INPATIENT PSYCHIATRY PROGRESS NOTE - NSBHATTESTTYPEVISIT_PSY_A_CORE
On-site Attending supervising MARCUS (99XXX codes)
MARCUS without on-site Attending supervision
On-site Attending supervising MARCUS (99XXX codes)
Attending Only
On-site Attending supervising MARCUS (99XXX codes)
Attending Only
On-site Attending supervising MARCUS (99XXX codes)
Attending Only
MARCUS without on-site Attending supervision
MARCUS without on-site Attending supervision
Attending Only

## 2024-11-07 NOTE — BH INPATIENT PSYCHIATRY PROGRESS NOTE - NSTXDCOTHRDATETRGT_PSY_ALL_CORE
30-Oct-2024

## 2024-11-07 NOTE — BH INPATIENT PSYCHIATRY PROGRESS NOTE - CURRENT MEDICATION
MEDICATIONS  (STANDING):  ARIPiprazole 10 milliGRAM(s) Oral daily  FLUoxetine 60 milliGRAM(s) Oral daily  hydrOXYzine hydrochloride 50 milliGRAM(s) Oral daily  melatonin. 5 milliGRAM(s) Oral at bedtime  prazosin. 2 milliGRAM(s) Oral at bedtime    MEDICATIONS  (PRN):  acetaminophen     Tablet .. 650 milliGRAM(s) Oral every 6 hours PRN Mild Pain (1 - 3), Moderate Pain (4 - 6)  diphenhydrAMINE 50 milliGRAM(s) Oral at bedtime PRN insomnia  hydrOXYzine hydrochloride 50 milliGRAM(s) Oral every 6 hours PRN Anxiety  ibuprofen  Tablet. 400 milliGRAM(s) Oral every 4 hours PRN Mild Pain (1 - 3), Moderate Pain (4 - 6)  LORazepam     Tablet 1 milliGRAM(s) Oral every 6 hours PRN severe anxiety/ agitation  LORazepam   Injectable 1 milliGRAM(s) IntraMuscular once PRN Agitation  traZODone 25 milliGRAM(s) Oral at bedtime PRN insomnia   MEDICATIONS  (STANDING):    MEDICATIONS  (PRN):

## 2024-11-07 NOTE — BH INPATIENT PSYCHIATRY PROGRESS NOTE - NSBHMSEAFFRANGE_PSY_A_CORE
Constricted
Full/Constricted
Constricted
Constricted
Full/Constricted
Constricted
Constricted
Full/Constricted
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
## 2024-11-07 NOTE — BH INPATIENT PSYCHIATRY PROGRESS NOTE - NSBHFUPINTERVALCCFT_PSY_A_CORE
pt seen for worsening depression with SI
pt seen for worsening depression with SI
Patient seen for follow up for BPD/SI/PTSD
pt seen for worsening depression with SI
Patient seen for follow up for BPD/SI/PTSD
pt seen for worsening depression with SI
pt seen for worsening depression with SI
Patient seen for follow up for BPD/SI/PTSD
pt seen for worsening depression with SI
pt seen for worsening depression with SI
reported feeling "depressed". 
reported feeling "depressed". 
pt seen for worsening depression with SI

## 2024-11-07 NOTE — BH INPATIENT PSYCHIATRY PROGRESS NOTE - NSBHCHARTREVIEWVS_PSY_A_CORE FT
Vital Signs Last 24 Hrs  T(C): 36.7 (11-07-24 @ 20:41), Max: 36.7 (11-07-24 @ 07:58)  T(F): 98 (11-07-24 @ 20:41), Max: 98 (11-07-24 @ 07:58)  HR: --  BP: --  BP(mean): --  RR: 14 (11-07-24 @ 07:58) (14 - 14)  SpO2: --    Orthostatic VS  11-07-24 @ 20:41  Lying BP: --/-- HR: --  Sitting BP: 125/86 HR: 86  Standing BP: 121/83 HR: 99  Site: upper right arm  Mode: electronic  Orthostatic VS  11-07-24 @ 07:58  Lying BP: --/-- HR: --  Sitting BP: 115/73 HR: 113  Standing BP: 111/78 HR: 118  Site: --  Mode: --  Orthostatic VS  11-06-24 @ 20:40  Lying BP: --/-- HR: --  Sitting BP: 108/78 HR: 95  Standing BP: 115/78 HR: 98  Site: --  Mode: --  Orthostatic VS  11-06-24 @ 08:36  Lying BP: --/-- HR: --  Sitting BP: 125/73 HR: 106  Standing BP: 125/74 HR: 104  Site: upper right arm  Mode: electronic   Vital Signs Last 24 Hrs  T(C): 36.6 (11-08-24 @ 06:31), Max: 36.6 (11-08-24 @ 06:31)  T(F): 97.9 (11-08-24 @ 06:31), Max: 97.9 (11-08-24 @ 06:31)  HR: --  BP: --  BP(mean): --  RR: --  SpO2: --    Orthostatic VS  11-08-24 @ 06:31  Lying BP: --/-- HR: --  Sitting BP: 106/72 HR: 96  Standing BP: 116/72 HR: 113  Site: --  Mode: --  Orthostatic VS  11-07-24 @ 20:41  Lying BP: --/-- HR: --  Sitting BP: 125/86 HR: 86  Standing BP: 121/83 HR: 99  Site: upper right arm  Mode: electronic  Orthostatic VS  11-07-24 @ 07:58  Lying BP: --/-- HR: --  Sitting BP: 115/73 HR: 113  Standing BP: 111/78 HR: 118  Site: --  Mode: --

## 2024-11-07 NOTE — BH INPATIENT PSYCHIATRY PROGRESS NOTE - NSTXDCOTHRGOAL_PSY_ALL_CORE
Oriented - self; Oriented - place; Oriented - time
Pt will show a reduction of symptoms and engage meaningfully with writer to identify a safe discharge plan. Pt will comply with recommended tx plan and medications for 5 days. Identify 2 benefits for adherring to tx.

## 2024-11-07 NOTE — BH INPATIENT PSYCHIATRY PROGRESS NOTE - NSICDXBHPRIMARYDX_PSY_ALL_CORE
MDD (major depressive disorder), recurrent episode, severe   F33.2  

## 2024-11-07 NOTE — BH INPATIENT PSYCHIATRY PROGRESS NOTE - NSTXDEPRESINTERMD_PSY_ALL_CORE
psychopharmacology  titrate prozac  titrate abilify

## 2024-11-07 NOTE — BH INPATIENT PSYCHIATRY PROGRESS NOTE - NSBHFUPINTERVALHXFT_PSY_A_CORE
Chart reviewed, case discussed in team. No acute events overnight, pt reports she did not have nightmares, was able to get sufficient sleep, does not endorse being particularly bothered by dreams and tolerated med changes well. Report this morning that she felt down and had some passive SI in context to concerns of status of relationship, responds well to support provided and received Ativan 1mg PO prn x 1 with benefit.  She denies any lingering SI or urges to self injure when assessed later.  Discussed plans for outpatient treatment and hopes for discharge Friday with coping strategies reinforced.  No agitation and no somatic complaints. Chart reviewed, case discussed in team. No acute events overnight, pt reports she did not have nightmares, was able to get sufficient sleep, does not endorse being particularly bothered by dreams and tolerated med changes well. Report this morning that she felt anxiety and had some passive SI in context to concerns of status of relationship, responds well to support provided and able to utilize coping strategies.  She denies any active SI or urges to self injure when assessed.  Discussed plans for outpatient treatment and hopes for discharge Friday with coping strategies reinforced.  No agitation and no somatic complaints.  Motivated for discharge luis antonio for tomorrow.

## 2024-11-07 NOTE — BH INPATIENT PSYCHIATRY PROGRESS NOTE - PRN MEDS
MEDICATIONS  (PRN):  acetaminophen     Tablet .. 650 milliGRAM(s) Oral every 6 hours PRN Mild Pain (1 - 3), Moderate Pain (4 - 6)  diphenhydrAMINE 50 milliGRAM(s) Oral at bedtime PRN insomnia  hydrOXYzine hydrochloride 50 milliGRAM(s) Oral every 6 hours PRN Anxiety  ibuprofen  Tablet. 400 milliGRAM(s) Oral every 4 hours PRN Mild Pain (1 - 3), Moderate Pain (4 - 6)  LORazepam     Tablet 1 milliGRAM(s) Oral every 6 hours PRN severe anxiety/ agitation  LORazepam   Injectable 1 milliGRAM(s) IntraMuscular once PRN Agitation  traZODone 25 milliGRAM(s) Oral at bedtime PRN insomnia   MEDICATIONS  (PRN):

## 2024-11-07 NOTE — BH INPATIENT PSYCHIATRY PROGRESS NOTE - NSTXDCOTHRDATEEST_PSY_ALL_CORE
23-Oct-2024

## 2024-11-07 NOTE — BH INPATIENT PSYCHIATRY PROGRESS NOTE - NSTXDEPRESDATETRGT_PSY_ALL_CORE
29-Oct-2024

## 2024-11-07 NOTE — BH INPATIENT PSYCHIATRY PROGRESS NOTE - NSDCCRITERIA_PSY_ALL_CORE
CGI <2 return to baseline functioning as evidenced by behavioral control, staff observation, and pt self report

## 2024-11-07 NOTE — BH INPATIENT PSYCHIATRY PROGRESS NOTE - NSTXDEPRESGOAL_PSY_ALL_CORE
Will identify thoughts and self-talk that contribute to depression

## 2024-11-07 NOTE — BH INPATIENT PSYCHIATRY PROGRESS NOTE - NSTXDEPRESDATEEST_PSY_ALL_CORE
22-Oct-2024

## 2024-11-07 NOTE — BH INPATIENT PSYCHIATRY PROGRESS NOTE - NSBHATTESTBILLING_PSY_A_CORE
37328-Yhtulapxdb OBS or IP - moderate complexity OR 35-49 mins
91908-Axdnauoddh OBS or IP - moderate complexity OR 35-49 mins
99223-Initial OBS or IP - high complexity OR  mins
00546-Gyxpejyins OBS or IP - moderate complexity OR 35-49 mins
06811-Dupzbntger OBS or IP - moderate complexity OR 35-49 mins
22019-Zwocarthdp OBS or IP - moderate complexity OR 35-49 mins
16021-Inooyykktk OBS or IP - moderate complexity OR 35-49 mins
01113-Ygcvkcxtoa OBS or IP - moderate complexity OR 35-49 mins
02192-Mccdaxyklv OBS or IP - moderate complexity OR 35-49 mins
65101-Ugwkxipoco OBS or IP - moderate complexity OR 35-49 mins
33591-Mpbvwjfqrz OBS or IP - moderate complexity OR 35-49 mins
36196-Tqcyerveog OBS or IP - moderate complexity OR 35-49 mins
36761-Vlxzljnvsm OBS or IP - moderate complexity OR 35-49 mins

## 2024-11-07 NOTE — BH INPATIENT PSYCHIATRY PROGRESS NOTE - NSTXSUICIDPROGRES_PSY_ALL_CORE
Improving
No Change
Improving
No Change
Improving
No Change
Improving
No Change

## 2024-11-07 NOTE — BH INPATIENT PSYCHIATRY PROGRESS NOTE - NSBHMSEAFFQUAL_PSY_A_CORE
Depressed
Euthymic
Depressed
Euthymic
Depressed
Depressed
Euthymic

## 2024-11-08 VITALS — TEMPERATURE: 98 F

## 2024-11-08 PROCEDURE — 99238 HOSP IP/OBS DSCHRG MGMT 30/<: CPT

## 2024-11-08 PROCEDURE — 90853 GROUP PSYCHOTHERAPY: CPT

## 2024-11-08 RX ADMIN — Medication 50 MILLIGRAM(S): at 09:53

## 2024-11-08 RX ADMIN — ARIPIPRAZOLE 10 MILLIGRAM(S): 2 TABLET ORAL at 09:59

## 2024-11-08 RX ADMIN — Medication 60 MILLIGRAM(S): at 09:53

## 2024-11-08 NOTE — BH PSYCHOLOGY - CLINICIAN PSYCHOTHERAPY NOTE - TOKEN PULL-DIAGNOSIS
Primary Diagnosis:  MDD (major depressive disorder), recurrent episode, severe [F33.2]        Problem Dx:   
Primary Diagnosis:  MDD (major depressive disorder), recurrent episode, severe [F33.2]        Problem Dx:   Borderline personality disorder [F60.3]

## 2024-11-08 NOTE — BH PSYCHOLOGY - GROUP THERAPY NOTE - NSBHPSYCHOLPARTICIP_PSY_A_CORE
Fully engaged
Partially engaged
Fully engaged
Fully engaged

## 2024-11-08 NOTE — BH PSYCHOLOGY - CLINICIAN PSYCHOTHERAPY NOTE - NSBHPSYCHOLPROBS_PSY_ALL_CORE
Depression/Self Injurious Behavior/Suicidality
Depression/Suicidality
Depression/Self Injurious Behavior/Suicidality
Depression/Suicidality
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
## 2024-11-08 NOTE — BH PSYCHOLOGY - GROUP THERAPY NOTE - NSBHPSYCHOLRESPONSE_PSY_A_CORE
Coping skills acquired/Insight displayed/Accepted support
Accepted support
Coping skills acquired/Insight displayed/Accepted support
Coping skills acquired/Insight displayed/Accepted support
Accepted support
Coping skills acquired/Insight displayed/Accepted support

## 2024-11-08 NOTE — BH PSYCHOLOGY - GROUP THERAPY NOTE - NSPSYCHOLGRPBILLING_PSY_A_CORE
[id number] - Group Psychotherapy
[id number] - Group Psychotherapy
[id number] - Group Psychotherapy
[id number] - Group Psychotherapy
[id number] - Group Psychotherapy
83331 - Group Psychotherapy
[id number] - Group Psychotherapy
[id number] - Group Psychotherapy

## 2024-11-08 NOTE — BH PSYCHOLOGY - CLINICIAN PSYCHOTHERAPY NOTE - NSBHPTASSESSDT_PSY_A_CORE
05-Nov-2024 09:10
29-Oct-2024 09:25
08-Nov-2024 09:20
24-Oct-2024 01:20
01-Nov-2024 09:00
04-Nov-2024 09:20

## 2024-11-08 NOTE — BH PSYCHOLOGY - CLINICIAN PSYCHOTHERAPY NOTE - NSBHPSYCHOLASSESSPROV_PSY_A_CORE
Psychology Trainee only
Licensed Psychologist
Licensed Psychologist
Psychology Trainee only

## 2024-11-08 NOTE — BH DISCHARGE NOTE NURSING/SOCIAL WORK/PSYCH REHAB - DISCHARGE INSTRUCTIONS AFTERCARE APPOINTMENTS
In order to check the location, date, or time of your aftercare appointment, please refer to your Discharge Instructions Document given to you upon leaving the hospital.  If you have lost the instructions please call [phone]

## 2024-11-08 NOTE — BH DISCHARGE NOTE NURSING/SOCIAL WORK/PSYCH REHAB - NSDPDISTO_PSY_ALL_CORE
Subjective:      Patient ID: Horace Fernandez is a 3 y o  female    Steph Valdovinos is here with mom for a follow up from urgent care  Child was sick with belly pain 1 5 weeks ago  She then started to have a headache, fatigue and decreased appetite  Fever 102 2 spike at the beginning of the illness and cough/congestion began  Child was seen at urgent care on 10/15/22  Strep, flu and COVID negative  Not tested for RSV  Fever lasted 5 days, since resolved  Still with with nausea and congestion  No V/D  Eating and drinking normally  Sleeping well without cough at night  The following portions of the patient's history were reviewed and updated as appropriate:   She  has a past medical history of Urinary tract infection  Patient Active Problem List    Diagnosis Date Noted   • Obesity peds (BMI >=95 percentile) 05/24/2017     Current Outpatient Medications   Medication Sig Dispense Refill   • cetirizine (ZyrTEC) 10 MG chewable tablet Chew 1 tablet (10 mg total) daily 30 tablet 1   • fluticasone (FLONASE) 50 mcg/act nasal spray 2 sprays into each nostril daily (Patient not taking: No sig reported) 11 mL 1   • ibuprofen (MOTRIN) 100 mg/5 mL suspension Take 12 mL by mouth every 8 (eight) hours as needed for mild pain for up to 10 doses (Patient not taking: No sig reported) 120 mL 0     No current facility-administered medications for this visit  She has No Known Allergies  Review of Systems as per HPI    Objective:    Vitals:    10/25/22 1413   BP: (!) 102/54   BP Location: Left arm   Patient Position: Sitting   Pulse: (!) 118   Temp: 97 2 °F (36 2 °C)   TempSrc: Temporal   SpO2: 97%   Weight: 56 6 kg (124 lb 12 8 oz)   Height: 4' 8 18" (1 427 m)       Physical Exam  HENT:      Right Ear: Tympanic membrane and ear canal normal       Left Ear: Tympanic membrane and ear canal normal       Nose: Congestion present        Mouth/Throat:      Mouth: Mucous membranes are moist       Pharynx: No posterior oropharyngeal erythema  Eyes:      Conjunctiva/sclera: Conjunctivae normal    Cardiovascular:      Rate and Rhythm: Normal rate and regular rhythm  Heart sounds: Normal heart sounds  No murmur heard  Pulmonary:      Effort: Pulmonary effort is normal       Breath sounds: Normal breath sounds  Abdominal:      General: Bowel sounds are normal  There is no distension  Palpations: Abdomen is soft  Musculoskeletal:      Cervical back: Neck supple  Skin:     Capillary Refill: Capillary refill takes less than 2 seconds  Findings: No rash  Neurological:      Mental Status: She is alert  Assessment/Plan:     Diagnoses and all orders for this visit:    Follow-up exam    Upper respiratory tract infection, unspecified type      Carla Benavides is getting over a cold, likely RSV  She is improving well and only has some residual congestion with associated nausea  Continue supportive care measures and follow up as needed for any worsening or concerns      [facility]

## 2024-11-08 NOTE — BH INPATIENT PSYCHIATRY DISCHARGE NOTE - NSDCMRMEDTOKEN_GEN_ALL_CORE_FT
ARIPiprazole 10 mg oral tablet: 1 tab(s) orally once a day  FLUoxetine 20 mg oral capsule: 3 cap(s) orally once a day  hydrOXYzine hydrochloride 50 mg oral tablet: 1 tab(s) orally once a day  prazosin 2 mg oral capsule: 1 cap(s) orally once a day (at bedtime)
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
## 2024-11-08 NOTE — BH PSYCHOLOGY - GROUP THERAPY NOTE - NSBHPTASSESSDT_PSY_A_CORE
31-Oct-2024 14:40
04-Nov-2024 11:15
08-Nov-2024 11:15
24-Oct-2024 10:15
07-Nov-2024 11:15
30-Oct-2024 09:15
06-Nov-2024 09:15
01-Nov-2024 11:15

## 2024-11-08 NOTE — BH INPATIENT PSYCHIATRY DISCHARGE NOTE - NSBHMETABOLIC_PSY_ALL_CORE_FT
BMI: BMI (kg/m2): 18.7 (10-22-24 @ 22:07)  HbA1c: A1C with Estimated Average Glucose Result: 4.9 % (10-23-24 @ 10:12)    Glucose:   BP: --Vital Signs Last 24 Hrs  T(C): 36.6 (11-08-24 @ 06:31), Max: 36.7 (11-07-24 @ 20:41)  T(F): 97.9 (11-08-24 @ 06:31), Max: 98 (11-07-24 @ 20:41)  HR: --  BP: --  BP(mean): --  RR: --  SpO2: --    Orthostatic VS  11-08-24 @ 06:31  Lying BP: --/-- HR: --  Sitting BP: 106/72 HR: 96  Standing BP: 116/72 HR: 113  Site: --  Mode: --  Orthostatic VS  11-07-24 @ 20:41  Lying BP: --/-- HR: --  Sitting BP: 125/86 HR: 86  Standing BP: 121/83 HR: 99  Site: upper right arm  Mode: electronic  Orthostatic VS  11-07-24 @ 07:58  Lying BP: --/-- HR: --  Sitting BP: 115/73 HR: 113  Standing BP: 111/78 HR: 118  Site: --  Mode: --  Orthostatic VS  11-06-24 @ 20:40  Lying BP: --/-- HR: --  Sitting BP: 108/78 HR: 95  Standing BP: 115/78 HR: 98  Site: --  Mode: --    Lipid Panel: Date/Time: 10-23-24 @ 10:12  Cholesterol, Serum: 195  LDL Cholesterol Calculated: 127  HDL Cholesterol, Serum: 50  Total Cholesterol/HDL Ration Measurement: --  Triglycerides, Serum: 92
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
## 2024-11-08 NOTE — BH PSYCHOLOGY - GROUP THERAPY NOTE - NSPSYCHOLGRPDBTINT_PSY_A_CORE FT
taught distress tolerance skill 
taught emotion regulation skill 
taught mindfulness skill 
taught distress tolerance skill 
taught distress tolerance skill 
taught emotion regulation skill 
taught emotion regulation skill
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
## 2024-11-08 NOTE — BH PSYCHOLOGY - GROUP THERAPY NOTE - NSPSYCHOLGRPDBTPT_PSY_A_CORE
stable mood and affect in group/no self-destructive impulses/behaviors/Patient able to identify mood states/other...
stable mood and affect in group/no self-destructive impulses/behaviors/Patient able to identify mood states/other...
stable mood and affect in group/no self-destructive impulses/behaviors/Patient able to identify mood states
stable mood and affect in group/no self-destructive impulses/behaviors/Patient able to identify mood states/other...
stable mood and affect in group/no self-destructive impulses/behaviors/Patient able to identify mood states/other...
stable mood and affect in group/no self-destructive impulses/behaviors/other...
stable mood and affect in group/no self-destructive impulses/behaviors/Patient able to identify mood states/other...

## 2024-11-08 NOTE — BH INPATIENT PSYCHIATRY DISCHARGE NOTE - NSBHFUPINTERVALHXFT_PSY_A_CORE
Chart reviewed, case discussed in team. No acute events overnight, pt reports she did not have nightmares, was able to get sufficient sleep, does not endorse being particularly bothered by dreams and tolerated med changes well. Report this morning that she felt anxiety and had some passive SI in context to concerns of status of relationship, responds well to support provided and able to utilize coping strategies.  She denies any active SI or urges to self injure when assessed.  Discussed plans for outpatient treatment and hopes for discharge Friday with coping strategies reinforced.  No agitation and no somatic complaints.  Motivated for discharge [facility] for tomorrow. Chart reviewed, case discussed in team. No acute events overnight, pt reports she did not have nightmares, was able to get sufficient sleep, does not endorse being particularly bothered by dreams and tolerated med changes well. Report this morning that she felt anxiety and had some passive SI in context to concerns of status of relationship, responds well to support provided and able to utilize coping strategies.  She denies any active SI or urges to self injure when assessed.  Discussed plans for outpatient treatment with coping strategies reinforced.  No agitation and no somatic complaints.  Motivated for discharge for today.

## 2024-11-08 NOTE — BH DISCHARGE NOTE NURSING/SOCIAL WORK/PSYCH REHAB - PATIENT PORTAL LINK FT
You can access the FollowMyHealth Patient Portal offered by [facility] by registering at the following website: [email or web address]. By joining [facility]’s FollowMyHealth portal, you will also be able to view your health information using other applications (apps) compatible with our system.

## 2024-11-08 NOTE — BH PSYCHOLOGY - GROUP THERAPY NOTE - NSPSYCHOLGRPDBTGOAL_PSY_A_CORE
reduce mood and affective lability/reduce impulsive self-defeating behavior/reduce self-injurious behavior/reduce suicidal ideation/risk of attempt/improve ability to indentify feelings/improve ability to communicate feelings/reduce vulnerability to emotional dysregualation

## 2024-11-08 NOTE — BH PSYCHOLOGY - GROUP THERAPY NOTE - NSBHPSYCHOLGRPTYPE_PSY_A_CORE
DBT Life Skills
Cognitive Behavioral Coping Skills
DBT Life Skills

## 2024-11-08 NOTE — BH DISCHARGE NOTE NURSING/SOCIAL WORK/PSYCH REHAB - NSDCPRGOAL_PSY_ALL_CORE
Pt made some progress towards her discharge. Pt has identified her coping skills such as check facts, grounding skills, and breathing technique to manage SI. Pt has recognized her early warning signs such as when people talk about sex and death, increased heartbeat, feeling tight in chest, cannot breath to manage symptom. Pt denies SI, HI, AH, and VH. Pt has demonstrated daily compliant with medication regimen and dosage. Pt has verbally agreed to comply with medication post-discharge. Pt has identified benefits of medication compliance such as less depressed. During this hospitalization, pt attended groups. Pt was visible on the unit, interacts well with others. Pt showed fair ADLs. Pt has participated in his safety plan.

## 2024-11-08 NOTE — BH PSYCHOLOGY - CLINICIAN PSYCHOTHERAPY NOTE - NSBHPSYCHOLGOALS_PSY_A_CORE
Decrease symptoms/Assessment/Improve social/vocational/coping skills/Psychoeducation
Decrease symptoms/Assessment/Improve social/vocational/coping skills/Psychoeducation
Decrease symptoms/Improve social/vocational/coping skills/Psychoeducation
Decrease symptoms/Assessment/Improve social/vocational/coping skills/Psychoeducation
Decrease symptoms/Assessment/Improve social/vocational/coping skills/Prevent relapse/Psychoeducation
Decrease symptoms/Assessment/Improve social/vocational/coping skills/Psychoeducation

## 2024-11-08 NOTE — BH PSYCHOLOGY - GROUP THERAPY NOTE - NSPSYCHOLGRPDBTPT_PSY_A_CORE FT
DBT Group is a group in which patients learn skills to better manage their emotions and behaviors. Group begins with a mindfulness practice so that patients have an opportunity to practice observing their internal and external experiences. Following the mindfulness exercise the group learns new skills in a didactic format. Group today focused on the “distress tolerance” module. Specifically, patients learned the “IMPROVE” skill which involves using mental techniques to lower distress. These techniques include using imagery, making meaning, using prayer, focusing on one thing at a time, taking a vacation, and using encouragement. Patients practiced some of these skills in session as they were guided through a relaxation and grounding exercise. 
DBT Group is a group in which patients learn skills to better manage their emotions and behaviors. Group begins with a mindfulness practice so that patients have an opportunity to practice observing their internal and external experiences.  Following the mindfulness exercise the group learns new skills in a didactic format. Group today focused on the “emotion regulation” module.  Specifically, patients learned about the skill of Problem Solving, which is a method of managing difficult situations when an emotion is justified by the situation.  Patients were taught the seven steps of problem solving and provided with an example of a situation in which the skill would be useful.  Patients were also asked to think about when the skill would be helpful in their lives and how to apply the steps. Patients were also provided with a worksheet in order to help them practice the skill. 
DBT Group is a group in which patients learn skills to better manage their emotions and behaviors. Group today focused on the “mindfulness” module, which are skills to help patients increase their awareness of their present experience without judgment. Pts were taught the “what” skills (observe, describe, participate) and “how” skills (non-judgmentally, effectively, and one-mindfully) of mindfulness, and engaged in a variety of mindfulness exercises to practice the skills, and shared observations at the end of each activity. 
DBT Group is a group in which patients learn skills to better manage their emotions and behaviors. Group begins with a mindfulness practice so that patients have an opportunity to practice observing their internal and external experiences.  Following the mindfulness exercise the group learns new skills in a didactic format. Group today focused on the “distress tolerance” module.  Specifically, patients learned the skills of “STOP,” which teaches patients to pause and think before acting on emotions, and “pros and cons,” which is a way to objectively look at impulsive and destructive behaviors and see the short term and long term consequences of them. Patients were guided through an example of pros and cons provided by the writer as well as scenarios where the STOP skill is relevant, and were encouraged to complete their own pros and cons and STOP skills for homework. 
DBT Group is a group in which patients learn skills to better manage their emotions and behaviors. Group begins with a mindfulness practice so that patients have an opportunity to practice observing their internal and external experiences.  Following the mindfulness exercise the group learns new skills in a didactic format. Group today focused on the “distress tolerance” module, which are skills to help patients manage their crisis urges.  Specifically, pts learned the skill of “radical acceptance,” which includes accepting painful situations in their lives they cannot change through turning the mind and practicing willingness. Patients were asked to determine difficult situations in their lives they needed to work on accepting, and provided examples of ways to practice this while in the hospital. 
DBT Group is a group in which patients learn skills to better manage their emotions and behaviors. Group begins with a mindfulness practice so that patients have an opportunity to practice observing their internal and external experiences. Following the mindfulness exercise the group learns new skills in a didactic format. Group today focused on the “emotion regulation” module. Specifically, patients learned “accumulating positives,” which focused on ways to build up positive experiences in the short term to balance out negative experiences in their lives. Patients were given a pleasant activities list for ideas of positive activities they can incorporate into their everyday lives. Patients were asked to commit to pleasant activities they would engage in on the unit today to improve their moods.

## 2024-11-08 NOTE — BH INPATIENT PSYCHIATRY DISCHARGE NOTE - NSDCCPCAREPLAN_GEN_ALL_CORE_FT
PRINCIPAL DISCHARGE DIAGNOSIS  Diagnosis: Borderline personality disorder  Assessment and Plan of Treatment:       SECONDARY DISCHARGE DIAGNOSES  Diagnosis: MDD (major depressive disorder), recurrent episode, severe  Assessment and Plan of Treatment:

## 2024-11-08 NOTE — BH PSYCHOLOGY - GROUP THERAPY NOTE - NSBHPSYCHOLGRPNAME_PSY_A_CORE
DBT Skills
CBT (Cognitive Behavioral Therapy) Group

## 2024-11-08 NOTE — BH INPATIENT PSYCHIATRY DISCHARGE NOTE - NSBHMSEPERCEPT_PSY_A_CORE
Patient requests all Lab, Cardiology, and Radiology Results on their Discharge Instructions Pt feels subconscious telling her to harm herself/Auditory hallucinations/Other Pt feels subconscious telling her to harm herself/No abnormalities
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
## 2024-11-08 NOTE — BH PSYCHOLOGY - GROUP THERAPY NOTE - TOKEN PULL-DIAGNOSIS
Primary Diagnosis:  MDD (major depressive disorder), recurrent episode, severe [F33.2]        Problem Dx:   
Primary Diagnosis:  MDD (major depressive disorder), recurrent episode, severe [F33.2]        Problem Dx:   Borderline personality disorder [F60.3]      
Primary Diagnosis:  MDD (major depressive disorder), recurrent episode, severe [F33.2]        Problem Dx:   
Primary Diagnosis:  MDD (major depressive disorder), recurrent episode, severe [F33.2]        Problem Dx:   
Primary Diagnosis:  MDD (major depressive disorder), recurrent episode, severe [F33.2]        Problem Dx:   Borderline personality disorder [F60.3]      
Primary Diagnosis:  MDD (major depressive disorder), recurrent episode, severe [F33.2]        Problem Dx:   Borderline personality disorder [F60.3]      
Primary Diagnosis:  MDD (major depressive disorder), recurrent episode, severe [F33.2]        Problem Dx:   
Primary Diagnosis:  MDD (major depressive disorder), recurrent episode, severe [F33.2]        Problem Dx:
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
## 2024-11-08 NOTE — BH PSYCHOLOGY - GROUP THERAPY NOTE - NSPSYCHOLGRPDBTPROB_PSY_A_CORE
emotional dysregulation/impulsive behaviors/labile affect/self-injury/suicidal ideation

## 2024-11-08 NOTE — BH PSYCHOLOGY - CLINICIAN PSYCHOTHERAPY NOTE - NSBHPSYCHOLINT_PSY_A_CORE
Cognitive/behavioral therapy/Dialectical  Behavioral Therapy (DBT)
Cognitive/behavioral therapy/Dialectical  Behavioral Therapy (DBT)
Cognitive/behavioral therapy/Dialectical  Behavioral Therapy (DBT)/Dynamic issues addressed/Supported coping skills/Supportive therapy
Cognitive/behavioral therapy/Dialectical  Behavioral Therapy (DBT)
Cognitive/behavioral therapy/Dialectical  Behavioral Therapy (DBT)/Dynamic issues addressed/Supported coping skills/Supportive therapy/other...
Cognitive/behavioral therapy/Dialectical  Behavioral Therapy (DBT)

## 2024-11-08 NOTE — BH INPATIENT PSYCHIATRY DISCHARGE NOTE - DESCRIPTION
Lives with mother, step-father, and sister. Current psychology major at [employer],

## 2024-11-08 NOTE — BH DISCHARGE NOTE NURSING/SOCIAL WORK/PSYCH REHAB - NSCDUDCCRISIS_PSY_A_CORE
[facility]  1 [phone]) [facility] ([phone])  Text "WELL" to 42626  Website: www.[employer].InsideTrack/.National Suicide Prevention Lifeline 0 (171) 581-2040/.  [employer]  1 (077) [employer] (779-0631)/.  [facility]  [address] [address] [phone]   Hours:  Monday through Friday from 9 AM to 3 PM/988 Suicide and Crisis Lifeline

## 2025-01-05 ENCOUNTER — NON-APPOINTMENT (OUTPATIENT)
Age: 20
End: 2025-01-05

## 2025-01-08 ENCOUNTER — APPOINTMENT (OUTPATIENT)
Dept: ORTHOPEDIC SURGERY | Facility: CLINIC | Age: 20
End: 2025-01-08
Payer: COMMERCIAL

## 2025-01-08 VITALS
WEIGHT: 125 LBS | DIASTOLIC BLOOD PRESSURE: 73 MMHG | SYSTOLIC BLOOD PRESSURE: 114 MMHG | BODY MASS INDEX: 17.9 KG/M2 | HEIGHT: 70 IN | HEART RATE: 90 BPM

## 2025-01-08 DIAGNOSIS — T14.8XXA OTHER INJURY OF UNSPECIFIED BODY REGION, INITIAL ENCOUNTER: ICD-10-CM

## 2025-01-08 PROCEDURE — 99203 OFFICE O/P NEW LOW 30 MIN: CPT

## 2025-01-08 RX ORDER — TRAZODONE HYDROCHLORIDE 300 MG/1
TABLET ORAL
Refills: 0 | Status: ACTIVE | COMMUNITY

## 2025-01-08 RX ORDER — FLUOXETINE 20 MG/1
TABLET ORAL
Refills: 0 | Status: ACTIVE | COMMUNITY

## 2025-01-08 RX ORDER — HYDROXYZINE HYDROCHLORIDE 50 MG/1
TABLET ORAL
Refills: 0 | Status: ACTIVE | COMMUNITY

## 2025-04-17 NOTE — BH PATIENT PROFILE - NSVRISKSCORE_PSY_ALL_CORE
Previsit Planning        Reason for visit: Follow-up     Relevant Information: After Imaging    Records/imaging/labs/orders: Available    Rooming: Virtual   0

## 2025-07-30 NOTE — ED BEHAVIORAL HEALTH ASSESSMENT NOTE - SUBSTANCE ISSUES AND PLAN (INCLUDE STANDING AND PRN MEDICATION)
Butalbital Pending    Insurance response  Prescription Drug Insurance: [place] Medicaid  Notes: Faxed prior authorization request to [phone] for signature. Please sign and fax to [facility] [phone], and they will complete the PA.     Please update encounter when faxed to pharmacy. Thank you!      [No Acute Distress] : no acute distress [Normal Oropharynx] : normal oropharynx [Normal Appearance] : normal appearance [No Neck Mass] : no neck mass [Normal Rate/Rhythm] : normal rate/rhythm [Normal S1, S2] : normal s1, s2 [No Murmurs] : no murmurs [No Resp Distress] : no resp distress [Clear to Auscultation Bilaterally] : clear to auscultation bilaterally [Normal to Percussion] : normal to percussion [No Abnormalities] : no abnormalities [Benign] : benign [No HSM] : no hsm [Normal Gait] : normal gait [No Clubbing] : no clubbing [No Cyanosis] : no cyanosis [No Edema] : no edema [Normal Color/ Pigmentation] : normal color/ pigmentation [No Focal Deficits] : no focal deficits [Oriented x3] : oriented x3 [Normal Affect] : normal affect [TextBox_2] : Overweight (no history of complicated withdrawal or symptoms of severe withdrawal eg seizures delirium tremens); no recent use